# Patient Record
Sex: FEMALE | Race: WHITE | Employment: UNEMPLOYED | URBAN - METROPOLITAN AREA
[De-identification: names, ages, dates, MRNs, and addresses within clinical notes are randomized per-mention and may not be internally consistent; named-entity substitution may affect disease eponyms.]

---

## 2020-09-30 ENCOUNTER — HOSPITAL ENCOUNTER (INPATIENT)
Age: 35
LOS: 1 days | Discharge: HOME | DRG: 560 | End: 2020-10-01
Attending: OBSTETRICS & GYNECOLOGY | Admitting: OBSTETRICS & GYNECOLOGY
Payer: MEDICAID

## 2020-09-30 ENCOUNTER — APPOINTMENT (OUTPATIENT)
Dept: LABOR AND DELIVERY | Age: 35
DRG: 560 | End: 2020-09-30
Payer: MEDICAID

## 2020-09-30 ENCOUNTER — ANESTHESIA EVENT (OUTPATIENT)
Dept: LABOR AND DELIVERY | Age: 35
End: 2020-09-30

## 2020-09-30 ENCOUNTER — ANESTHESIA (OUTPATIENT)
Dept: LABOR AND DELIVERY | Age: 35
End: 2020-09-30

## 2020-09-30 PROBLEM — Z3A.39 39 WEEKS GESTATION OF PREGNANCY: Status: ACTIVE | Noted: 2020-09-30

## 2020-09-30 LAB
ABO/RH: NORMAL
AMPHETAMINE SCREEN, URINE: NOT DETECTED
ANTIBODY SCREEN: NORMAL
BARBITURATE SCREEN URINE: NOT DETECTED
BENZODIAZEPINE SCREEN, URINE: NOT DETECTED
CANNABINOID SCREEN URINE: POSITIVE
COCAINE METABOLITE SCREEN URINE: POSITIVE
FENTANYL SCREEN, URINE: NOT DETECTED
HCT VFR BLD CALC: 34.8 % (ref 34–48)
HEMOGLOBIN: 11.8 G/DL (ref 11.5–15.5)
Lab: ABNORMAL
MCH RBC QN AUTO: 31.7 PG (ref 26–35)
MCHC RBC AUTO-ENTMCNC: 33.9 % (ref 32–34.5)
MCV RBC AUTO: 93.5 FL (ref 80–99.9)
METHADONE SCREEN, URINE: NOT DETECTED
OPIATE SCREEN URINE: NOT DETECTED
OXYCODONE URINE: NOT DETECTED
PDW BLD-RTO: 13.6 FL (ref 11.5–15)
PHENCYCLIDINE SCREEN URINE: NOT DETECTED
PLATELET # BLD: 233 E9/L (ref 130–450)
PMV BLD AUTO: 10.6 FL (ref 7–12)
RBC # BLD: 3.72 E12/L (ref 3.5–5.5)
WBC # BLD: 10.2 E9/L (ref 4.5–11.5)

## 2020-09-30 PROCEDURE — 1220000000 HC SEMI PRIVATE OB R&B

## 2020-09-30 PROCEDURE — 6370000000 HC RX 637 (ALT 250 FOR IP): Performed by: OBSTETRICS & GYNECOLOGY

## 2020-09-30 PROCEDURE — 80307 DRUG TEST PRSMV CHEM ANLYZR: CPT

## 2020-09-30 PROCEDURE — 7200000001 HC VAGINAL DELIVERY

## 2020-09-30 PROCEDURE — 2580000003 HC RX 258: Performed by: OBSTETRICS & GYNECOLOGY

## 2020-09-30 PROCEDURE — 86900 BLOOD TYPING SEROLOGIC ABO: CPT

## 2020-09-30 PROCEDURE — 6360000002 HC RX W HCPCS: Performed by: OBSTETRICS & GYNECOLOGY

## 2020-09-30 PROCEDURE — 6360000002 HC RX W HCPCS

## 2020-09-30 PROCEDURE — 86901 BLOOD TYPING SEROLOGIC RH(D): CPT

## 2020-09-30 PROCEDURE — 36415 COLL VENOUS BLD VENIPUNCTURE: CPT

## 2020-09-30 PROCEDURE — G0480 DRUG TEST DEF 1-7 CLASSES: HCPCS

## 2020-09-30 PROCEDURE — 3E033VJ INTRODUCTION OF OTHER HORMONE INTO PERIPHERAL VEIN, PERCUTANEOUS APPROACH: ICD-10-PCS | Performed by: OBSTETRICS & GYNECOLOGY

## 2020-09-30 PROCEDURE — 88307 TISSUE EXAM BY PATHOLOGIST: CPT

## 2020-09-30 PROCEDURE — 86850 RBC ANTIBODY SCREEN: CPT

## 2020-09-30 PROCEDURE — 85027 COMPLETE CBC AUTOMATED: CPT

## 2020-09-30 RX ORDER — NALBUPHINE HCL 10 MG/ML
5 AMPUL (ML) INJECTION EVERY 4 HOURS PRN
Status: DISCONTINUED | OUTPATIENT
Start: 2020-09-30 | End: 2020-09-30 | Stop reason: RX

## 2020-09-30 RX ORDER — FERROUS SULFATE 325(65) MG
325 TABLET ORAL 2 TIMES DAILY WITH MEALS
Status: DISCONTINUED | OUTPATIENT
Start: 2020-09-30 | End: 2020-10-01 | Stop reason: HOSPADM

## 2020-09-30 RX ORDER — DOCUSATE SODIUM 100 MG/1
100 CAPSULE, LIQUID FILLED ORAL 2 TIMES DAILY
Status: DISCONTINUED | OUTPATIENT
Start: 2020-09-30 | End: 2020-10-01 | Stop reason: HOSPADM

## 2020-09-30 RX ORDER — LIDOCAINE HYDROCHLORIDE 10 MG/ML
INJECTION, SOLUTION INFILTRATION; PERINEURAL
Status: DISCONTINUED
Start: 2020-09-30 | End: 2020-09-30

## 2020-09-30 RX ORDER — NALOXONE HYDROCHLORIDE 0.4 MG/ML
0.4 INJECTION, SOLUTION INTRAMUSCULAR; INTRAVENOUS; SUBCUTANEOUS PRN
Status: DISCONTINUED | OUTPATIENT
Start: 2020-09-30 | End: 2020-09-30

## 2020-09-30 RX ORDER — SODIUM CHLORIDE, SODIUM LACTATE, POTASSIUM CHLORIDE, CALCIUM CHLORIDE 600; 310; 30; 20 MG/100ML; MG/100ML; MG/100ML; MG/100ML
INJECTION, SOLUTION INTRAVENOUS CONTINUOUS
Status: DISCONTINUED | OUTPATIENT
Start: 2020-09-30 | End: 2020-09-30

## 2020-09-30 RX ORDER — ACETAMINOPHEN 650 MG
TABLET, EXTENDED RELEASE ORAL
Status: DISCONTINUED
Start: 2020-09-30 | End: 2020-09-30

## 2020-09-30 RX ORDER — ONDANSETRON 4 MG/1
8 TABLET, ORALLY DISINTEGRATING ORAL EVERY 8 HOURS PRN
Status: DISCONTINUED | OUTPATIENT
Start: 2020-09-30 | End: 2020-10-01 | Stop reason: HOSPADM

## 2020-09-30 RX ORDER — ONDANSETRON 2 MG/ML
4 INJECTION INTRAMUSCULAR; INTRAVENOUS EVERY 6 HOURS PRN
Status: DISCONTINUED | OUTPATIENT
Start: 2020-09-30 | End: 2020-09-30

## 2020-09-30 RX ORDER — SIMETHICONE 80 MG
80 TABLET,CHEWABLE ORAL EVERY 6 HOURS PRN
Status: DISCONTINUED | OUTPATIENT
Start: 2020-09-30 | End: 2020-10-01 | Stop reason: HOSPADM

## 2020-09-30 RX ORDER — ONDANSETRON 2 MG/ML
4 INJECTION INTRAMUSCULAR; INTRAVENOUS EVERY 6 HOURS PRN
Status: DISCONTINUED | OUTPATIENT
Start: 2020-09-30 | End: 2020-10-01 | Stop reason: HOSPADM

## 2020-09-30 RX ORDER — IBUPROFEN 800 MG/1
800 TABLET ORAL EVERY 8 HOURS
Status: DISCONTINUED | OUTPATIENT
Start: 2020-10-01 | End: 2020-10-01 | Stop reason: HOSPADM

## 2020-09-30 RX ORDER — MODIFIED LANOLIN
OINTMENT (GRAM) TOPICAL PRN
Status: DISCONTINUED | OUTPATIENT
Start: 2020-09-30 | End: 2020-10-01 | Stop reason: HOSPADM

## 2020-09-30 RX ORDER — ACETAMINOPHEN 325 MG/1
650 TABLET ORAL EVERY 4 HOURS PRN
Status: DISCONTINUED | OUTPATIENT
Start: 2020-09-30 | End: 2020-10-01 | Stop reason: HOSPADM

## 2020-09-30 RX ORDER — BISACODYL 10 MG
10 SUPPOSITORY, RECTAL RECTAL DAILY PRN
Status: DISCONTINUED | OUTPATIENT
Start: 2020-09-30 | End: 2020-10-01 | Stop reason: HOSPADM

## 2020-09-30 RX ORDER — HYDROCODONE BITARTRATE AND ACETAMINOPHEN 5; 325 MG/1; MG/1
1 TABLET ORAL EVERY 4 HOURS PRN
Status: DISCONTINUED | OUTPATIENT
Start: 2020-09-30 | End: 2020-10-01 | Stop reason: HOSPADM

## 2020-09-30 RX ADMIN — HYDROCODONE BITARTRATE AND ACETAMINOPHEN 1 TABLET: 5; 325 TABLET ORAL at 16:31

## 2020-09-30 RX ADMIN — HYDROCODONE BITARTRATE AND ACETAMINOPHEN 1 TABLET: 5; 325 TABLET ORAL at 20:32

## 2020-09-30 RX ADMIN — Medication 1 MILLI-UNITS/MIN: at 08:15

## 2020-09-30 RX ADMIN — DOCUSATE SODIUM 100 MG: 100 CAPSULE ORAL at 20:32

## 2020-09-30 RX ADMIN — SODIUM CHLORIDE, POTASSIUM CHLORIDE, SODIUM LACTATE AND CALCIUM CHLORIDE: 600; 310; 30; 20 INJECTION, SOLUTION INTRAVENOUS at 08:00

## 2020-09-30 RX ADMIN — BUTORPHANOL TARTRATE 1 MG: 2 INJECTION, SOLUTION INTRAMUSCULAR; INTRAVENOUS at 12:54

## 2020-09-30 RX ADMIN — BUTORPHANOL TARTRATE 2 MG: 2 INJECTION, SOLUTION INTRAMUSCULAR; INTRAVENOUS at 10:46

## 2020-09-30 ASSESSMENT — PAIN SCALES - GENERAL
PAINLEVEL_OUTOF10: 9
PAINLEVEL_OUTOF10: 2
PAINLEVEL_OUTOF10: 10
PAINLEVEL_OUTOF10: 8
PAINLEVEL_OUTOF10: 7

## 2020-09-30 ASSESSMENT — LIFESTYLE VARIABLES: SMOKING_STATUS: 1

## 2020-09-30 NOTE — PROGRESS NOTES
Received phone call that patient was [facility] however was refusing to have the fetal monitors on due to discomfort. Upon arrival pitocin was off and there were no monitors on the baby. D/w patient and partner that they need to understand I have no idea how the fetal heart rate/pattern is doing. She could be having decelerations that I am not aware of and these could affect the baby possibly even in the long term. Pt understands but is continuing to refuse them.

## 2020-09-30 NOTE — ANESTHESIA PRE PROCEDURE
Department of Anesthesiology  Preprocedure Note       Name:  Ruth Celestin   Age:  37 y.o.  :  1985                                          MRN:  [id number]         Date:  2020      Surgeon: * No surgeons listed *    Procedure: * No procedures listed *    Medications prior to admission:   Prior to Admission medications    Medication Sig Start Date End Date Taking? Authorizing Provider   Prenatal MV & Min w/FA-DHA (PRENATAL ADULT GUMMY/DHA/FA PO) Take by mouth   Yes Historical Provider, MD   Prenatal Vit-Fe Fumarate-FA (PRENATAL VITAMIN) 27-0.8 MG TABS Take 1 tablet by mouth daily 3/19/20  Yes JACK Diggs - CNP       Current medications:    Current Facility-Administered Medications   Medication Dose Route Frequency Provider Last Rate Last Dose    povidone-iodine (BETADINE) 10 % external solution             lidocaine 1 % injection             lactated ringers infusion   Intravenous Continuous Selma Cellar,  mL/hr at 20 0800      ondansetron (ZOFRAN) injection 4 mg  4 mg Intravenous Q6H PRN Selma Cellar, DO        oxytocin (PITOCIN) 30 units in 500 mL infusion  1 vivian-units/min Intravenous Continuous PRN Selma Cellar, DO        oxytocin (PITOCIN) 30 units in 500 mL infusion  1 vivian-units/min Intravenous Continuous Selma Cellar, DO 1 mL/hr at 20 0815 1 vivian-units/min at 20 0815    butorphanol (STADOL) injection 2 mg  2 mg Intravenous Q3H PRN Selma Cellar, DO           Allergies:  No Known Allergies    Problem List:    Patient Active Problem List   Diagnosis Code    39 weeks gestation of pregnancy [id number].[id number]       Past Medical History:        Diagnosis Date    Mitral valve prolapse     Mitral valve prolapse        Past Surgical History:  No past surgical history on file.     Social History:    Social History     Tobacco Use    Smoking status: Current Every Day Smoker     Packs/day: 0.25     Types: Cigarettes    Smokeless tobacco: Never Used Substance Use Topics    Alcohol use: Not Currently     Comment: occasionally                                Ready to quit: Not Answered  Counseling given: Not Answered      Vital Signs (Current):   Vitals:    09/30/20 0742   BP: 134/67   Pulse: 96   Resp: 16   Temp: 36.9 °C (98.4 °F)   TempSrc: Oral   Weight: 148 lb (67.1 kg)   Height: 5' 5\" (1.651 m)                                              BP Readings from Last 3 Encounters:   09/30/20 134/67   09/29/20 115/83   09/22/20 125/85       NPO Status:                                                                                 BMI:   Wt Readings from Last 3 Encounters:   09/30/20 148 lb (67.1 kg)   09/29/20 148 lb 9.6 oz (67.4 kg)   09/22/20 146 lb 12.8 oz (66.6 kg)     Body mass index is 24.63 kg/m². CBC:   Lab Results   Component Value Date    WBC 10.2 09/30/2020    RBC 3.72 09/30/2020    HGB 11.8 09/30/2020    HCT 34.8 09/30/2020    MCV 93.5 09/30/2020    RDW 13.6 09/30/2020     09/30/2020       CMP: No results found for: NA, K, CL, CO2, BUN, CREATININE, GFRAA, AGRATIO, LABGLOM, GLUCOSE, PROT, CALCIUM, BILITOT, ALKPHOS, AST, ALT    POC Tests: No results for input(s): POCGLU, POCNA, POCK, POCCL, POCBUN, POCHEMO, POCHCT in the last 72 hours.     Coags: No results found for: PROTIME, INR, APTT    HCG (If Applicable):   Lab Results   Component Value Date    PREGTESTUR POSITIVE 03/19/2020        ABGs: No results found for: PHART, PO2ART, VLE7OIU, FHU1ERQ, BEART, Q5JLAFTF     Type & Screen (If Applicable):  No results found for: LABABO, LABRH    Drug/Infectious Status (If Applicable):  No results found for: HIV, HEPCAB    COVID-19 Screening (If Applicable): No results found for: COVID19      Anesthesia Evaluation  Patient summary reviewed and Nursing notes reviewed no history of anesthetic complications:   Airway: Mallampati: II  TM distance: >3 FB   Neck ROM: full  Mouth opening: > = 3 FB Dental:      Comment: Pt denies chipped, missing or loose teeth Pulmonary:   (+) decreased breath sounds,  current smoker ( chana >3 weeks, did smoke cigarettes 9/30/20)          Patient smoked on day of surgery. Cardiovascular:    (+) valvular problems/murmurs: MVP,         Rhythm: regular  Rate: normal           Beta Blocker:  Not on Beta Blocker      ROS comment: Pt states she had hx of MVP, no current issues. Pt has not taken medications for it since 2003 and currently does not see a cardiologist.     Neuro/Psych:   Negative Neuro/Psych ROS              GI/Hepatic/Renal:   (+) GERD: well controlled,           Endo/Other:                     Abdominal:           Vascular: negative vascular ROS. Anesthesia Plan      general, spinal and epidural     ASA 2     (Discussed in thorough detail risks and benefits associated with an epidural, spinal and general anesthesia. Pt understands and has no questions at this time.)        Anesthetic plan and risks discussed with patient. Use of blood products discussed with patient whom consented to blood products. Plan discussed with CRNA and attending.                 Eugene Donato RN   9/30/2020

## 2020-09-30 NOTE — L&D DELIVERY NOTE
Rafiq Morales  37 y.o. French Done at Gestational Age: 44 wks delivered via spontaneous vaginal under no anesthesia over no episiotomy a male infant at 1500  Weighing pending Apgars 8,9. Placenta with 3VC. No lacerations. Shoulder delivered without difficulty. EBL 100cc. Nuchal x 2. Cord gases were obtained.     Alesha Magdaleno  9/30/2020 3:10 PM

## 2020-09-30 NOTE — PROGRESS NOTES
Went to retrieve betadine and lidocaine which was sitting on the front counter, placed there by me. Medications were missing. Asked other staff members if they had taken it, and no one has seen it. Social work consulted for positive drug screen.

## 2020-09-30 NOTE — PROGRESS NOTES
Dr Daniel Byrd updated on [facility] AC/0 station. Patient feeling pressure with the occasional urge to push. Patient is refusing to be monitored on the EFM. Ordered to shut pitocin off unless she is cooperative with pitocin. Dr Daniel Byrd on her way for delivery.

## 2020-09-30 NOTE — PROGRESS NOTES
Dr Karo Tesfaye updated on [facility] 4-5/80/-2. Reviewed FHR tracing for moderate variability with accels and occasional variable. Patient very uncomfortable, but does not want an epidural. No new orders at this time.

## 2020-09-30 NOTE — H&P
CHIEF COMPLAINT:  IOL    HISTORY OF PRESENT ILLNESS:      The patient is a 37 y.o. female at 39w0d. OB History        3    Para   2    Term                AB        Living           SAB        TAB        Ectopic        Molar        Multiple        Live Births                Patient presents with a chief complaint as above and is being admitted for induction    Estimated Due Date: Estimated Date of Delivery: 10/7/20    PRENATAL CARE:    Complicated by: none    PAST OB HISTORY  OB History        3    Para   2    Term                AB        Living           SAB        TAB        Ectopic        Molar        Multiple        Live Births                    Past Medical History:        Diagnosis Date    Mitral valve prolapse     Mitral valve prolapse      Past Surgical History:    No past surgical history on file. Allergies:  Patient has no known allergies.   Social History:    Social History     Socioeconomic History    Marital status: Single     Spouse name: Not on file    Number of children: Not on file    Years of education: Not on file    Highest education level: Not on file   Occupational History    Not on file   Social Needs    Financial resource strain: Not on file    Food insecurity     Worry: Not on file     Inability: Not on file    Transportation needs     Medical: Not on file     Non-medical: Not on file   Tobacco Use    Smoking status: Current Every Day Smoker     Packs/day: 0.25     Types: Cigarettes    Smokeless tobacco: Never Used   Substance and Sexual Activity    Alcohol use: Not Currently     Comment: occasionally    Drug use: Yes     Types: Marijuana    Sexual activity: Yes     Partners: Male   Lifestyle    Physical activity     Days per week: Not on file     Minutes per session: Not on file    Stress: Not on file   Relationships    Social connections     Talks on phone: Not on file     Gets together: Not on file     Attends Caodaism service: Not on file     Active member of club or organization: Not on file     Attends meetings of clubs or organizations: Not on file     Relationship status: Not on file    Intimate partner violence     Fear of current or ex partner: Not on file     Emotionally abused: Not on file     Physically abused: Not on file     Forced sexual activity: Not on file   Other Topics Concern    Not on file   Social History Narrative    Not on file     Family History:       Problem Relation Age of Onset    Breast Cancer Paternal Grandmother     Breast Cancer Maternal Grandmother      Medications Prior to Admission:  Medications Prior to Admission: Prenatal MV & Min w/FA-DHA (PRENATAL ADULT GUMMY/DHA/FA PO), Take by mouth  Prenatal Vit-Fe Fumarate-FA (PRENATAL VITAMIN) 27-0.8 MG TABS, Take 1 tablet by mouth daily  [DISCONTINUED] ibuprofen (ADVIL;MOTRIN) 600 MG tablet, Take 1 tablet by mouth every 6 hours as needed for Pain. REVIEW OF SYSTEMS:    CONSTITUTIONAL:  negative  RESPIRATORY:  negative  CARDIOVASCULAR:  negative  GASTROINTESTINAL:  negative  ALLERGIC/IMMUNOLOGIC:  negative  NEUROLOGICAL:  negative  BEHAVIOR/PSYCH:  negative    PHYSICAL EXAM:  Vitals:    09/30/20 0742   BP: 134/67   Pulse: 96   Resp: 16   Temp: 98.4 °F (36.9 °C)   TempSrc: Oral   Weight: 148 lb (67.1 kg)   Height: 5' 5\" (1.651 m)     General appearance:  awake, alert, cooperative, no apparent distress, and appears stated age  Neurologic:  Awake, alert, oriented to name, place and time. Lungs:  No increased work of breathing, good air exchange  Abdomen:  Soft, non tender, gravid, consistent with her gestational age   Fetal heart rate:  Reassuring.   Pelvis:  Adequate pelvis  Cervix: 2 cm 75% soft -2  Contraction frequency:  0 minutes    Membranes:  Intact    ASSESSMENT AND PLAN:    Labor: admit  Fetus: Reassuring  GBS: No  Other: pitocin      Electronically signed by Juan Solorzano DO on 9/30/2020 at 9:11 AM

## 2020-10-01 VITALS
BODY MASS INDEX: 24.66 KG/M2 | DIASTOLIC BLOOD PRESSURE: 61 MMHG | RESPIRATION RATE: 16 BRPM | TEMPERATURE: 98.4 F | SYSTOLIC BLOOD PRESSURE: 111 MMHG | HEART RATE: 88 BPM | HEIGHT: 65 IN | WEIGHT: 148 LBS

## 2020-10-01 LAB
HCT VFR BLD CALC: 34.5 % (ref 34–48)
HEMOGLOBIN: 11.7 G/DL (ref 11.5–15.5)

## 2020-10-01 PROCEDURE — 6370000000 HC RX 637 (ALT 250 FOR IP): Performed by: OBSTETRICS & GYNECOLOGY

## 2020-10-01 PROCEDURE — 36415 COLL VENOUS BLD VENIPUNCTURE: CPT

## 2020-10-01 PROCEDURE — 85018 HEMOGLOBIN: CPT

## 2020-10-01 PROCEDURE — 85014 HEMATOCRIT: CPT

## 2020-10-01 RX ADMIN — IBUPROFEN 800 MG: 800 TABLET ORAL at 11:51

## 2020-10-01 RX ADMIN — DOCUSATE SODIUM 100 MG: 100 CAPSULE ORAL at 08:11

## 2020-10-01 RX ADMIN — HYDROCODONE BITARTRATE AND ACETAMINOPHEN 1 TABLET: 5; 325 TABLET ORAL at 02:41

## 2020-10-01 RX ADMIN — HYDROCODONE BITARTRATE AND ACETAMINOPHEN 1 TABLET: 5; 325 TABLET ORAL at 13:30

## 2020-10-01 RX ADMIN — HYDROCODONE BITARTRATE AND ACETAMINOPHEN 1 TABLET: 5; 325 TABLET ORAL at 08:11

## 2020-10-01 ASSESSMENT — PAIN SCALES - GENERAL
PAINLEVEL_OUTOF10: 8
PAINLEVEL_OUTOF10: 5
PAINLEVEL_OUTOF10: 8
PAINLEVEL_OUTOF10: 8

## 2020-10-01 NOTE — PLAN OF CARE
Problem: Fluid Volume - Imbalance:  Goal: Absence of imbalanced fluid volume signs and symptoms  Description: Absence of imbalanced fluid volume signs and symptoms  Outcome: Met This Shift  Goal: Absence of postpartum hemorrhage signs and symptoms  Description: Absence of postpartum hemorrhage signs and symptoms  Outcome: Met This Shift     Problem: Infection - Risk of, Puerperal Infection:  Goal: Will show no infection signs and symptoms  Description: Will show no infection signs and symptoms  Outcome: Met This Shift     Problem: Mood - Altered:  Goal: Mood stable  Description: Mood stable  Outcome: Met This Shift     Problem: Pain - Acute:  Goal: Pain level will decrease  Description: Pain level will decrease  Outcome: Met This Shift     Problem:  Body Temperature -  Risk of, Imbalanced  Goal: Ability to maintain a body temperature in the normal range will improve to within specified parameters  Description: Ability to maintain a body temperature in the normal range will improve to within specified parameters  Outcome: Met This Shift     Problem: Breastfeeding - Ineffective:  Goal: Effective breastfeeding  Description: Effective breastfeeding  Outcome: Completed  Goal: Infant weight gain appropriate for age will improve to within specified parameters  Description: Infant weight gain appropriate for age will improve to within specified parameters  Outcome: Completed  Goal: Ability to achieve and maintain adequate urine output will improve to within specified parameters  Description: Ability to achieve and maintain adequate urine output will improve to within specified parameters  Outcome: Completed     Problem: Infant Care:  Goal: Will show no infection signs and symptoms  Description: Will show no infection signs and symptoms  Outcome: Met This Shift     Problem: Pain:  Goal: Pain level will decrease  Description: Pain level will decrease  Outcome: Met This Shift  Goal: Control of acute pain  Description: Control
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
## 2020-10-01 NOTE — PROGRESS NOTES
Patient ate 75% of dinner. Discharge instructions given to patient for herself and her Baby. SHe verbalizes understanding.

## 2020-10-01 NOTE — PROGRESS NOTES
Progress Note    SUBJECTIVE: patient status post vaginal delivery day 1 doing well ,no complaints normal postpartum course    OBJECTIVE:    VITALS:  /71   Pulse 77   Temp 98 °F (36.7 °C) (Oral)   Resp 16   Ht 5' 5\" (1.651 m)   Wt 148 lb (67.1 kg)   LMP 01/01/2020 (Exact Date)   Breastfeeding Unknown   BMI 24.63 kg/m²   Physical Exam  lung:cta  Heart : regular rythm  Abdomen:soft non tender ,firm uterus ,bs present  Extremities :normal no evidence of DVT  DATA:  CBC:   Lab Results   Component Value Date    WBC 10.2 09/30/2020    RBC 3.72 09/30/2020    HGB 11.7 10/01/2020    HCT 34.5 10/01/2020    MCV 93.5 09/30/2020    MCH 31.7 09/30/2020    MCHC 33.9 09/30/2020    RDW 13.6 09/30/2020     09/30/2020    MPV 10.6 09/30/2020       ASSESSMENT AND PLAN:  Patient Active Problem List   Diagnosis    39 weeks gestation of pregnancy       Normal post partum care   Anticipate discharge home

## 2020-10-01 NOTE — CARE COORDINATION
she was willing to work and \" do anything\" to make sure she could parent baby. SW provided support and counseling resources. SW completed [facility] ( [phone]) referral to Maryanne Brunner in intake, and received a return call from [facility] ( [phone]) supervisor, Dylon Santana, who reported that a case WILL be open with family, however at this time baby CAN be discharged home and they will follow in the community       PLAN    Baby CAN be discharged home when medically ready, children services WILL follow in the community.        Electronically signed by DUNIA Patel on 10/1/2020 at 11:58 AM

## 2020-10-06 LAB — CANNABINOIDS CONF, URINE: 126 NG/ML

## 2020-10-06 NOTE — CARE COORDINATION
SW Discharge Planning     SW noted baby's cordstat to be positive for THC, Benzoylecgonine and f-TE-Mgyyyoieazdpesj.  KAVON called [facility] ( [phone]) and provided information to , Shree Carey    Electronically signed by DUNIA Walter on 10/6/2020 at 2:47 PM

## 2020-10-07 LAB — COCAINE, CONFIRM, URINE: 713 NG/ML

## 2020-12-09 ENCOUNTER — OFFICE VISIT (OUTPATIENT)
Dept: INTERNAL MEDICINE CLINIC | Age: 35
End: 2020-12-09
Payer: MEDICAID

## 2020-12-09 VITALS
DIASTOLIC BLOOD PRESSURE: 61 MMHG | TEMPERATURE: 97.9 F | SYSTOLIC BLOOD PRESSURE: 129 MMHG | HEIGHT: 65 IN | BODY MASS INDEX: 20.49 KG/M2 | HEART RATE: 107 BPM | WEIGHT: 123 LBS

## 2020-12-09 LAB
ALCOHOL URINE: ABNORMAL
AMPHETAMINE SCREEN, URINE: ABNORMAL
BARBITURATE SCREEN, URINE: ABNORMAL
BENZODIAZEPINE SCREEN, URINE: ABNORMAL
BUPRENORPHINE URINE: ABNORMAL
COCAINE METABOLITE SCREEN URINE: ABNORMAL
FENTANYL SCREEN, URINE: ABNORMAL
GABAPENTIN SCREEN, URINE: ABNORMAL
MDMA URINE: ABNORMAL
METHADONE SCREEN, URINE: ABNORMAL
METHAMPHETAMINE, URINE: ABNORMAL
OPIATE SCREEN URINE: ABNORMAL
OXYCODONE SCREEN URINE: ABNORMAL
PHENCYCLIDINE SCREEN URINE: ABNORMAL
PROPOXYPHENE SCREEN, URINE: ABNORMAL
SYNTHETIC CANNABINOIDS(K2) SCREEN, URINE: ABNORMAL
THC SCREEN, URINE: ABNORMAL
TRAMADOL SCREEN URINE: ABNORMAL
TRICYCLIC ANTIDEPRESSANTS, UR: ABNORMAL

## 2020-12-09 PROCEDURE — G8427 DOCREV CUR MEDS BY ELIG CLIN: HCPCS | Performed by: INTERNAL MEDICINE

## 2020-12-09 PROCEDURE — 99204 OFFICE O/P NEW MOD 45 MIN: CPT | Performed by: INTERNAL MEDICINE

## 2020-12-09 PROCEDURE — 80305 DRUG TEST PRSMV DIR OPT OBS: CPT | Performed by: INTERNAL MEDICINE

## 2020-12-09 PROCEDURE — G8420 CALC BMI NORM PARAMETERS: HCPCS | Performed by: INTERNAL MEDICINE

## 2020-12-09 PROCEDURE — G8484 FLU IMMUNIZE NO ADMIN: HCPCS | Performed by: INTERNAL MEDICINE

## 2020-12-09 PROCEDURE — 4004F PT TOBACCO SCREEN RCVD TLK: CPT | Performed by: INTERNAL MEDICINE

## 2020-12-09 NOTE — PROGRESS NOTES
Verbal order per Dr. Wagner Mackenzie for urine drug screen. Positive for FTY BZO MOP THC . Verified results with Gayle Lopez RN. Dr. Wagner Mackenzie ordered Subutex 8mg tab daily for patient. Verified dose with patient. Patient was sent home with Subutex 8mg tab qty 1 out of stock and will be seen back in the office 12/10/20. Ordered Viral Load.
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
## 2020-12-10 ENCOUNTER — OFFICE VISIT (OUTPATIENT)
Dept: INTERNAL MEDICINE CLINIC | Age: 35
End: 2020-12-10
Payer: MEDICAID

## 2020-12-10 VITALS
BODY MASS INDEX: 20.83 KG/M2 | TEMPERATURE: 98.2 F | DIASTOLIC BLOOD PRESSURE: 60 MMHG | WEIGHT: 125 LBS | SYSTOLIC BLOOD PRESSURE: 116 MMHG | HEIGHT: 65 IN | HEART RATE: 91 BPM

## 2020-12-10 PROCEDURE — G8427 DOCREV CUR MEDS BY ELIG CLIN: HCPCS | Performed by: INTERNAL MEDICINE

## 2020-12-10 PROCEDURE — 99213 OFFICE O/P EST LOW 20 MIN: CPT | Performed by: INTERNAL MEDICINE

## 2020-12-10 PROCEDURE — 80305 DRUG TEST PRSMV DIR OPT OBS: CPT | Performed by: INTERNAL MEDICINE

## 2020-12-10 PROCEDURE — G8484 FLU IMMUNIZE NO ADMIN: HCPCS | Performed by: INTERNAL MEDICINE

## 2020-12-10 PROCEDURE — G8420 CALC BMI NORM PARAMETERS: HCPCS | Performed by: INTERNAL MEDICINE

## 2020-12-10 PROCEDURE — 4004F PT TOBACCO SCREEN RCVD TLK: CPT | Performed by: INTERNAL MEDICINE

## 2020-12-10 RX ORDER — BUPRENORPHINE HYDROCHLORIDE 8 MG/1
8 TABLET SUBLINGUAL DAILY
COMMUNITY
End: 2020-12-10 | Stop reason: SDUPTHER

## 2020-12-10 RX ORDER — BUPRENORPHINE HYDROCHLORIDE 8 MG/1
8 TABLET SUBLINGUAL 2 TIMES DAILY
Qty: 10 TABLET | Refills: 0 | Status: SHIPPED | OUTPATIENT
Start: 2020-12-10 | End: 2020-12-15 | Stop reason: SDUPTHER
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
## 2020-12-15 ENCOUNTER — OFFICE VISIT (OUTPATIENT)
Dept: INTERNAL MEDICINE CLINIC | Age: 35
End: 2020-12-15
Payer: MEDICAID

## 2020-12-15 VITALS
WEIGHT: 123 LBS | TEMPERATURE: 98 F | SYSTOLIC BLOOD PRESSURE: 133 MMHG | DIASTOLIC BLOOD PRESSURE: 69 MMHG | HEART RATE: 119 BPM | HEIGHT: 65 IN | BODY MASS INDEX: 20.49 KG/M2

## 2020-12-15 PROCEDURE — 80305 DRUG TEST PRSMV DIR OPT OBS: CPT | Performed by: INTERNAL MEDICINE

## 2020-12-15 PROCEDURE — G8428 CUR MEDS NOT DOCUMENT: HCPCS | Performed by: INTERNAL MEDICINE

## 2020-12-15 PROCEDURE — G8484 FLU IMMUNIZE NO ADMIN: HCPCS | Performed by: INTERNAL MEDICINE

## 2020-12-15 PROCEDURE — 4004F PT TOBACCO SCREEN RCVD TLK: CPT | Performed by: INTERNAL MEDICINE

## 2020-12-15 PROCEDURE — 99213 OFFICE O/P EST LOW 20 MIN: CPT | Performed by: INTERNAL MEDICINE

## 2020-12-15 PROCEDURE — G8420 CALC BMI NORM PARAMETERS: HCPCS | Performed by: INTERNAL MEDICINE

## 2020-12-15 RX ORDER — BUPRENORPHINE HYDROCHLORIDE 8 MG/1
8 TABLET SUBLINGUAL 2 TIMES DAILY
Qty: 14 TABLET | Refills: 0 | Status: SHIPPED | OUTPATIENT
Start: 2020-12-15 | End: 2020-12-22

## 2020-12-15 NOTE — PROGRESS NOTES
Skin: No rashes, lesions or abnormalities noted        URINE DRUG SCREEN TODAY:  No results for input(s): ALCOHOL, LABAMPH, LABBARB, LABBENZ, BUPRENUR, COCAIMETSCRU, FENTSCRUR, GABAPENTIN, MDMA, METAMPU, LABMETH, OPIATESCREENURINE, OXTCOSU, PHENCYCLIDINESCREENURINE, PROPOXYPHENE, SPICEUR, THCSCREENUR, TRAMADOLUR, TRICYUR in the last 72 hours. Diagnosis Orders   1. Severe opioid use disorder (HCC)  POCT Rapid Drug Screen    buprenorphine (SUBUTEX) 8 MG SUBL SL tablet   2. Encounter for monitoring Subutex maintenance therapy     3. Polysubstance abuse ([facility] [address]     4.  Breast feeding status of mother           PLAN:  I am going to put the patient on Subutex 8 mg twice daily   we gave her a list of counselors   she is also checking out  meetings  I am going to see her back next Tuesday

## 2020-12-23 ENCOUNTER — NURSE ONLY (OUTPATIENT)
Dept: INTERNAL MEDICINE CLINIC | Age: 35
End: 2020-12-23
Payer: MEDICAID

## 2020-12-23 ENCOUNTER — TELEPHONE (OUTPATIENT)
Dept: INTERNAL MEDICINE CLINIC | Age: 35
End: 2020-12-23

## 2020-12-23 VITALS
BODY MASS INDEX: 20.66 KG/M2 | SYSTOLIC BLOOD PRESSURE: 128 MMHG | HEIGHT: 65 IN | WEIGHT: 124 LBS | HEART RATE: 116 BPM | DIASTOLIC BLOOD PRESSURE: 76 MMHG | TEMPERATURE: 98 F

## 2020-12-23 PROCEDURE — 80305 DRUG TEST PRSMV DIR OPT OBS: CPT | Performed by: INTERNAL MEDICINE

## 2020-12-23 RX ORDER — BUPRENORPHINE HYDROCHLORIDE 8 MG/1
8 TABLET SUBLINGUAL 2 TIMES DAILY
COMMUNITY
End: 2020-12-23 | Stop reason: SDUPTHER

## 2020-12-23 RX ORDER — BUPRENORPHINE HYDROCHLORIDE 8 MG/1
8 TABLET SUBLINGUAL 2 TIMES DAILY
Qty: 16 TABLET | Refills: 0 | OUTPATIENT
Start: 2020-12-23 | End: 2020-12-29 | Stop reason: SDUPTHER

## 2020-12-23 NOTE — TELEPHONE ENCOUNTER
VO per Dr. Allan Rosario for Subutex 8mg tab BID for 8 day qty 16. LPN called in script of Subutex 8mg tab BID for 8 day qty 16 into [facility] in [facility].

## 2020-12-29 ENCOUNTER — OFFICE VISIT (OUTPATIENT)
Dept: INTERNAL MEDICINE CLINIC | Age: 35
End: 2020-12-29
Payer: MEDICAID

## 2020-12-29 VITALS
HEART RATE: 109 BPM | DIASTOLIC BLOOD PRESSURE: 72 MMHG | WEIGHT: 130 LBS | BODY MASS INDEX: 21.66 KG/M2 | TEMPERATURE: 97.3 F | SYSTOLIC BLOOD PRESSURE: 139 MMHG | HEIGHT: 65 IN

## 2020-12-29 PROCEDURE — 99213 OFFICE O/P EST LOW 20 MIN: CPT | Performed by: INTERNAL MEDICINE

## 2020-12-29 PROCEDURE — G8427 DOCREV CUR MEDS BY ELIG CLIN: HCPCS | Performed by: INTERNAL MEDICINE

## 2020-12-29 PROCEDURE — 80305 DRUG TEST PRSMV DIR OPT OBS: CPT | Performed by: INTERNAL MEDICINE

## 2020-12-29 PROCEDURE — 4004F PT TOBACCO SCREEN RCVD TLK: CPT | Performed by: INTERNAL MEDICINE

## 2020-12-29 PROCEDURE — G8420 CALC BMI NORM PARAMETERS: HCPCS | Performed by: INTERNAL MEDICINE

## 2020-12-29 PROCEDURE — G8484 FLU IMMUNIZE NO ADMIN: HCPCS | Performed by: INTERNAL MEDICINE

## 2020-12-29 RX ORDER — BUPRENORPHINE HYDROCHLORIDE 8 MG/1
8 TABLET SUBLINGUAL 2 TIMES DAILY
Qty: 28 TABLET | Refills: 0 | Status: SHIPPED | OUTPATIENT
Start: 2020-12-29 | End: 2021-01-12 | Stop reason: SDUPTHER

## 2020-12-29 NOTE — PROGRESS NOTES
Skin: No rashes, lesions or abnormalities noted        URINE DRUG SCREEN TODAY:  Recent Labs     12/29/20  0920   ALCOHOL NEG   LABAMPH NEG   LABBARB NEG   LABBENZ NEG   BUPRENUR POS   COCAIMETSCRU NEG   FENTSCRUR NEG   GABAPENTIN N/A   MDMA NEG   METAMPU NEG   LABMETH NEG   OPIATESCREENURINE NEG   OXTCOSU NEG   PHENCYCLIDINESCREENURINE NEG   PROPOXYPHENE N/A   SPICEUR NEG   THCSCREENUR NEG   TRAMADOLUR NEG   TRICYUR N/A           Diagnosis Orders   1. Severe opioid use disorder (HCC)  POCT Rapid Drug Screen    buprenorphine (SUBUTEX) 8 MG SUBL SL tablet   2. Encounter for monitoring Subutex maintenance therapy     3. Breast feeding status of mother     4.  Polysubstance abuse ([facility] [address]           PLAN:  I am going to continue the patient on Subutex 8 mg twice daily   we gave her a list of counselors   she is also checking out  meetings  I am going to see her back in 2 weeks  I reviewed the [facility] report     There does not appear to be any discrepancies or overprescribing of controlled substances

## 2021-01-12 ENCOUNTER — OFFICE VISIT (OUTPATIENT)
Dept: INTERNAL MEDICINE CLINIC | Age: 36
End: 2021-01-12
Payer: MEDICAID

## 2021-01-12 VITALS
BODY MASS INDEX: 21.99 KG/M2 | WEIGHT: 132 LBS | HEIGHT: 65 IN | DIASTOLIC BLOOD PRESSURE: 79 MMHG | SYSTOLIC BLOOD PRESSURE: 119 MMHG | HEART RATE: 105 BPM

## 2021-01-12 DIAGNOSIS — F11.20 SEVERE OPIOID USE DISORDER (HCC): Primary | ICD-10-CM

## 2021-01-12 DIAGNOSIS — Z51.81 ENCOUNTER FOR MONITORING SUBUTEX MAINTENANCE THERAPY: ICD-10-CM

## 2021-01-12 DIAGNOSIS — F19.10 POLYSUBSTANCE ABUSE (HCC): ICD-10-CM

## 2021-01-12 DIAGNOSIS — Z79.899 ENCOUNTER FOR MONITORING SUBUTEX MAINTENANCE THERAPY: ICD-10-CM

## 2021-01-12 PROCEDURE — 80305 DRUG TEST PRSMV DIR OPT OBS: CPT | Performed by: INTERNAL MEDICINE

## 2021-01-12 PROCEDURE — G8484 FLU IMMUNIZE NO ADMIN: HCPCS | Performed by: INTERNAL MEDICINE

## 2021-01-12 PROCEDURE — 4004F PT TOBACCO SCREEN RCVD TLK: CPT | Performed by: INTERNAL MEDICINE

## 2021-01-12 PROCEDURE — G8420 CALC BMI NORM PARAMETERS: HCPCS | Performed by: INTERNAL MEDICINE

## 2021-01-12 PROCEDURE — G8428 CUR MEDS NOT DOCUMENT: HCPCS | Performed by: INTERNAL MEDICINE

## 2021-01-12 PROCEDURE — 99213 OFFICE O/P EST LOW 20 MIN: CPT | Performed by: INTERNAL MEDICINE

## 2021-01-12 RX ORDER — BUPRENORPHINE HYDROCHLORIDE 8 MG/1
8 TABLET SUBLINGUAL 2 TIMES DAILY
Qty: 28 TABLET | Refills: 0 | Status: SHIPPED | OUTPATIENT
Start: 2021-01-12 | End: 2021-01-26 | Stop reason: SDUPTHER
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
## 2021-01-12 NOTE — PROGRESS NOTES
Verbal order per Dr. Renae Cat for urine drug screen. Positive for BUP. Verified results with Ana Lilia Harper RN. Dr. Renae Cat ordered Subutex 8mg tab BID  for patient. Verified dose with patient. Patient was sent home with 2 week script of Subutex 8mg tab BID and will be seen back in the office 1/26/21.

## 2021-01-26 ENCOUNTER — OFFICE VISIT (OUTPATIENT)
Dept: INTERNAL MEDICINE CLINIC | Age: 36
End: 2021-01-26
Payer: MEDICAID

## 2021-01-26 VITALS
WEIGHT: 131 LBS | HEIGHT: 65 IN | BODY MASS INDEX: 21.83 KG/M2 | DIASTOLIC BLOOD PRESSURE: 77 MMHG | HEART RATE: 104 BPM | SYSTOLIC BLOOD PRESSURE: 107 MMHG

## 2021-01-26 DIAGNOSIS — B18.2 CHRONIC HEPATITIS C WITHOUT HEPATIC COMA (HCC): ICD-10-CM

## 2021-01-26 DIAGNOSIS — F11.20 SEVERE OPIOID USE DISORDER (HCC): Primary | ICD-10-CM

## 2021-01-26 DIAGNOSIS — Z51.81 ENCOUNTER FOR MONITORING SUBUTEX MAINTENANCE THERAPY: ICD-10-CM

## 2021-01-26 DIAGNOSIS — F19.10 POLYSUBSTANCE ABUSE (HCC): ICD-10-CM

## 2021-01-26 DIAGNOSIS — Z79.899 ENCOUNTER FOR MONITORING SUBUTEX MAINTENANCE THERAPY: ICD-10-CM

## 2021-01-26 PROCEDURE — G8420 CALC BMI NORM PARAMETERS: HCPCS | Performed by: INTERNAL MEDICINE

## 2021-01-26 PROCEDURE — G8484 FLU IMMUNIZE NO ADMIN: HCPCS | Performed by: INTERNAL MEDICINE

## 2021-01-26 PROCEDURE — 99213 OFFICE O/P EST LOW 20 MIN: CPT | Performed by: INTERNAL MEDICINE

## 2021-01-26 PROCEDURE — G8427 DOCREV CUR MEDS BY ELIG CLIN: HCPCS | Performed by: INTERNAL MEDICINE

## 2021-01-26 PROCEDURE — 80305 DRUG TEST PRSMV DIR OPT OBS: CPT | Performed by: INTERNAL MEDICINE

## 2021-01-26 PROCEDURE — 4004F PT TOBACCO SCREEN RCVD TLK: CPT | Performed by: INTERNAL MEDICINE

## 2021-01-26 RX ORDER — BUPRENORPHINE HYDROCHLORIDE 8 MG/1
8 TABLET SUBLINGUAL 2 TIMES DAILY
Qty: 28 TABLET | Refills: 0 | Status: SHIPPED | OUTPATIENT
Start: 2021-01-26 | End: 2021-02-09 | Stop reason: SDUPTHER

## 2021-01-26 NOTE — PROGRESS NOTES
Verbal order per Dr. Donal Galo for urine drug screen. Positive for BUP. Verified results with Julien Mahajan RN. Dr. Donal Galo ordered Subutex 8mg tab BID  for patient. Verified dose with patient. Patient was sent home with 2 week script of Subutex 8mg tab BID and will be seen back in the office 2/9/21.
Thought Content: Thought content normal.         Judgment: Judgment normal.         Urine tox screen today  Alcohol, Urine 01/26/2021  9:42 AM Unknown   NEG    Amphetamine Screen, Urine 01/26/2021  9:42 AM Unknown   NEG    Barbiturate Screen, Urine 01/26/2021  9:42 AM Unknown   NEG    Benzodiazepine Screen, Urine 01/26/2021  9:42 AM Unknown   NEG    Buprenorphine Urine 01/26/2021  9:42 AM Unknown   POS    Cocaine Metabolite Screen, Urine 01/26/2021  9:42 AM Unknown   NEG    FENTANYL SCREEN, URINE 01/26/2021  9:42 AM Unknown   NEG    Gabapentin Screen, Urine 01/26/2021  9:42 AM Unknown   N/A    MDMA, Urine 01/26/2021  9:42 AM Unknown   NEG    Methadone Screen, Urine 01/26/2021  9:42 AM Unknown   NEG    Methamphetamine, Urine 01/26/2021  9:42 AM Unknown   NEG    Opiate Scrn, Ur 01/26/2021  9:42 AM Unknown   NEG    Oxycodone Screen, Ur 01/26/2021  9:42 AM Unknown   NEG    PCP Screen, Urine 01/26/2021  9:42 AM Unknown   NEG    Propoxyphene Screen, Urine 01/26/2021  9:42 AM Unknown   N/A    Synthetic Cannabinoids (K2) Screen, Urine 01/26/2021  9:42 AM Unknown   NEG    THC Screen, Urine 01/26/2021  9:42 AM Unknown   NEG    Tramadol Scrn, Ur 01/26/2021  9:42 AM Unknown   NEG    Tricyclic Antidepressants, Urine 01/26/2021  9:42 AM Unknown   N/A        I reviewed the [place] [facility] report     There does not appear to be any discrepancies or overprescribing of controlled substances          An electronic signature was used to authenticate this note.     --Wilmer Villalobos MD

## 2021-02-04 ENCOUNTER — TELEPHONE (OUTPATIENT)
Dept: INTERNAL MEDICINE CLINIC | Age: 36
End: 2021-02-04

## 2021-02-04 NOTE — LETTER
[id number] [facility] Internal Medicine and Medication Management  [place] [facility]  Phone: [phone]  Fax: [phone]    Annie Bautista MD        2021        To Whom it may concern,    Kelly Sahu ( [birth year]) initiated services at our clinic on 2020 for treatment of opioid use disorder. At this time she has started on suboxone therapy for treatment of her opioid use disorder. She continues to be compliant with treatment recommendations. She is currently being seen every 2 weeks for appointments. Her most recent appointment was 2021. Should you have further questions please contact our office at the number listed above.       Sincerely,        Annie Bautista MD

## 2021-02-09 ENCOUNTER — OFFICE VISIT (OUTPATIENT)
Dept: INTERNAL MEDICINE CLINIC | Age: 36
End: 2021-02-09
Payer: MEDICAID

## 2021-02-09 VITALS
DIASTOLIC BLOOD PRESSURE: 76 MMHG | BODY MASS INDEX: 22.49 KG/M2 | TEMPERATURE: 98.2 F | HEIGHT: 65 IN | HEART RATE: 103 BPM | WEIGHT: 135 LBS | SYSTOLIC BLOOD PRESSURE: 121 MMHG

## 2021-02-09 DIAGNOSIS — F19.10 POLYSUBSTANCE ABUSE (HCC): ICD-10-CM

## 2021-02-09 DIAGNOSIS — B18.2 CHRONIC HEPATITIS C WITHOUT HEPATIC COMA (HCC): ICD-10-CM

## 2021-02-09 DIAGNOSIS — F11.20 SEVERE OPIOID USE DISORDER (HCC): Primary | ICD-10-CM

## 2021-02-09 DIAGNOSIS — Z51.81 ENCOUNTER FOR MONITORING SUBUTEX MAINTENANCE THERAPY: ICD-10-CM

## 2021-02-09 DIAGNOSIS — Z79.899 ENCOUNTER FOR MONITORING SUBUTEX MAINTENANCE THERAPY: ICD-10-CM

## 2021-02-09 PROCEDURE — 80305 DRUG TEST PRSMV DIR OPT OBS: CPT | Performed by: INTERNAL MEDICINE

## 2021-02-09 PROCEDURE — 4004F PT TOBACCO SCREEN RCVD TLK: CPT | Performed by: INTERNAL MEDICINE

## 2021-02-09 PROCEDURE — G8428 CUR MEDS NOT DOCUMENT: HCPCS | Performed by: INTERNAL MEDICINE

## 2021-02-09 PROCEDURE — 99213 OFFICE O/P EST LOW 20 MIN: CPT | Performed by: INTERNAL MEDICINE

## 2021-02-09 PROCEDURE — G8484 FLU IMMUNIZE NO ADMIN: HCPCS | Performed by: INTERNAL MEDICINE

## 2021-02-09 PROCEDURE — G8420 CALC BMI NORM PARAMETERS: HCPCS | Performed by: INTERNAL MEDICINE

## 2021-02-09 RX ORDER — BUPRENORPHINE HYDROCHLORIDE 8 MG/1
8 TABLET SUBLINGUAL 2 TIMES DAILY
Qty: 28 TABLET | Refills: 0 | Status: SHIPPED | OUTPATIENT
Start: 2021-02-09 | End: 2021-02-23 | Stop reason: SDUPTHER

## 2021-02-09 NOTE — PROGRESS NOTES
Verbal order per Dr. Feliberto Boas for urine drug screen. Positive for BUP. Verified results with Sylvia Ward RN. Dr. Feliberto Boas ordered Subutex 8mg tab BID for patient. Verified dose with patient. Patient was sent home with 2 week script of Subutex 8mg tab BID and will be seen back in the office 2/23/21.
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
## 2021-02-23 ENCOUNTER — OFFICE VISIT (OUTPATIENT)
Dept: INTERNAL MEDICINE CLINIC | Age: 36
End: 2021-02-23
Payer: MEDICAID

## 2021-02-23 VITALS
HEIGHT: 65 IN | TEMPERATURE: 98.1 F | HEART RATE: 88 BPM | DIASTOLIC BLOOD PRESSURE: 69 MMHG | WEIGHT: 142 LBS | SYSTOLIC BLOOD PRESSURE: 121 MMHG | BODY MASS INDEX: 23.66 KG/M2

## 2021-02-23 DIAGNOSIS — B18.2 CHRONIC HEPATITIS C WITHOUT HEPATIC COMA (HCC): ICD-10-CM

## 2021-02-23 DIAGNOSIS — F19.10 POLYSUBSTANCE ABUSE (HCC): ICD-10-CM

## 2021-02-23 DIAGNOSIS — Z79.899 ENCOUNTER FOR MONITORING SUBUTEX MAINTENANCE THERAPY: ICD-10-CM

## 2021-02-23 DIAGNOSIS — F11.20 SEVERE OPIOID USE DISORDER (HCC): Primary | ICD-10-CM

## 2021-02-23 DIAGNOSIS — Z51.81 ENCOUNTER FOR MONITORING SUBUTEX MAINTENANCE THERAPY: ICD-10-CM

## 2021-02-23 PROCEDURE — G8420 CALC BMI NORM PARAMETERS: HCPCS | Performed by: INTERNAL MEDICINE

## 2021-02-23 PROCEDURE — G8428 CUR MEDS NOT DOCUMENT: HCPCS | Performed by: INTERNAL MEDICINE

## 2021-02-23 PROCEDURE — G8484 FLU IMMUNIZE NO ADMIN: HCPCS | Performed by: INTERNAL MEDICINE

## 2021-02-23 PROCEDURE — 80305 DRUG TEST PRSMV DIR OPT OBS: CPT | Performed by: INTERNAL MEDICINE

## 2021-02-23 PROCEDURE — 99213 OFFICE O/P EST LOW 20 MIN: CPT | Performed by: INTERNAL MEDICINE

## 2021-02-23 PROCEDURE — 4004F PT TOBACCO SCREEN RCVD TLK: CPT | Performed by: INTERNAL MEDICINE

## 2021-02-23 RX ORDER — BUPRENORPHINE HYDROCHLORIDE 8 MG/1
8 TABLET SUBLINGUAL 2 TIMES DAILY
Qty: 6 TABLET | Refills: 0 | Status: SHIPPED | OUTPATIENT
Start: 2021-02-23 | End: 2021-02-25 | Stop reason: SDUPTHER

## 2021-02-23 NOTE — PROGRESS NOTES
Verbal order per Dr. Rayshawn Cadet for urine drug screen. Positive for FTY THC. Verified results with Clara Yañez RN. Dr. Rayshawn Cadet ordered Subutex 8mg tab BID for patient. Verified dose with patient. Patient was sent home with 3 day script of Subutex 8mg tab BID and will be seen back in the office 2/25/21.

## 2021-02-25 ENCOUNTER — OFFICE VISIT (OUTPATIENT)
Dept: INTERNAL MEDICINE CLINIC | Age: 36
End: 2021-02-25
Payer: MEDICAID

## 2021-02-25 DIAGNOSIS — Z51.81 ENCOUNTER FOR MONITORING SUBUTEX MAINTENANCE THERAPY: ICD-10-CM

## 2021-02-25 DIAGNOSIS — F19.10 POLYSUBSTANCE ABUSE (HCC): ICD-10-CM

## 2021-02-25 DIAGNOSIS — Z79.899 ENCOUNTER FOR MONITORING SUBUTEX MAINTENANCE THERAPY: ICD-10-CM

## 2021-02-25 DIAGNOSIS — B18.2 CHRONIC HEPATITIS C WITHOUT HEPATIC COMA (HCC): ICD-10-CM

## 2021-02-25 DIAGNOSIS — F11.20 SEVERE OPIOID USE DISORDER (HCC): Primary | ICD-10-CM

## 2021-02-25 PROCEDURE — 4004F PT TOBACCO SCREEN RCVD TLK: CPT | Performed by: INTERNAL MEDICINE

## 2021-02-25 PROCEDURE — 80305 DRUG TEST PRSMV DIR OPT OBS: CPT | Performed by: INTERNAL MEDICINE

## 2021-02-25 PROCEDURE — G8427 DOCREV CUR MEDS BY ELIG CLIN: HCPCS | Performed by: INTERNAL MEDICINE

## 2021-02-25 PROCEDURE — G8484 FLU IMMUNIZE NO ADMIN: HCPCS | Performed by: INTERNAL MEDICINE

## 2021-02-25 PROCEDURE — G8420 CALC BMI NORM PARAMETERS: HCPCS | Performed by: INTERNAL MEDICINE

## 2021-02-25 PROCEDURE — 99213 OFFICE O/P EST LOW 20 MIN: CPT | Performed by: INTERNAL MEDICINE

## 2021-02-25 RX ORDER — BUPRENORPHINE HYDROCHLORIDE 8 MG/1
8 TABLET SUBLINGUAL 2 TIMES DAILY
Qty: 8 TABLET | Refills: 0 | Status: SHIPPED | OUTPATIENT
Start: 2021-02-25 | End: 2021-03-01

## 2021-02-25 NOTE — PROGRESS NOTES
Verbal order per Dr. Chris Jay for urine drug screen. Positive for BUP METH MDMA FTY THC. Verified results with Binu Key RN. Dr. Chris Jay ordered Subutex 8mg tab BID for patient. Verified dose with patient. Patient was sent home with 4 day script of Subutex 8mg tab BID and will be seen back in the office 3/1/21.
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
## 2021-03-04 ENCOUNTER — TELEPHONE (OUTPATIENT)
Dept: INTERNAL MEDICINE CLINIC | Age: 36
End: 2021-03-04

## 2021-03-04 NOTE — TELEPHONE ENCOUNTER
Pt called and left a voicemail requesting a call back urgently. RN called pt back twice-- no answer, VM full. Pt is to come in today for an appointment and will not be able to do a virtual visit with Dr. Susan Duffy.

## 2021-03-04 NOTE — TELEPHONE ENCOUNTER
RN attempted to call pt twice-- pt's phone went straight to  and  full. Unable to leave message. Pt is to be scheduled with Dr. King Ballard Monday-- no virtual visit-- pt is to come in.

## 2022-02-05 ENCOUNTER — HOSPITAL ENCOUNTER (INPATIENT)
Age: 37
LOS: 2 days | Discharge: HOME | DRG: 560 | End: 2022-02-07
Attending: OBSTETRICS & GYNECOLOGY | Admitting: OBSTETRICS & GYNECOLOGY
Payer: MEDICAID

## 2022-02-05 PROBLEM — Z3A.36 36 WEEKS GESTATION OF PREGNANCY: Status: ACTIVE | Noted: 2022-02-05

## 2022-02-05 LAB
ABO/RH: NORMAL
ALBUMIN SERPL-MCNC: 2.9 G/DL (ref 3.5–5.2)
ALP BLD-CCNC: 160 U/L (ref 35–104)
ALT SERPL-CCNC: 18 U/L (ref 0–32)
AMPHETAMINE SCREEN, URINE: NOT DETECTED
ANION GAP SERPL CALCULATED.3IONS-SCNC: 12 MMOL/L (ref 7–16)
ANTIBODY SCREEN: NORMAL
AST SERPL-CCNC: 20 U/L (ref 0–31)
BACTERIA: ABNORMAL /HPF
BARBITURATE SCREEN URINE: NOT DETECTED
BENZODIAZEPINE SCREEN, URINE: NOT DETECTED
BILIRUB SERPL-MCNC: 0.5 MG/DL (ref 0–1.2)
BILIRUBIN URINE: NEGATIVE
BLOOD, URINE: ABNORMAL
BUN BLDV-MCNC: 9 MG/DL (ref 6–20)
CALCIUM SERPL-MCNC: 8.8 MG/DL (ref 8.6–10.2)
CANNABINOID SCREEN URINE: POSITIVE
CHLORIDE BLD-SCNC: 100 MMOL/L (ref 98–107)
CLARITY: ABNORMAL
CO2: 22 MMOL/L (ref 22–29)
COCAINE METABOLITE SCREEN URINE: POSITIVE
COLOR: ABNORMAL
CREAT SERPL-MCNC: 0.7 MG/DL (ref 0.5–1)
EPITHELIAL CELLS, UA: ABNORMAL /HPF
FENTANYL SCREEN, URINE: POSITIVE
GFR AFRICAN AMERICAN: >60
GFR NON-AFRICAN AMERICAN: >60 ML/MIN/1.73
GLUCOSE BLD-MCNC: 140 MG/DL (ref 74–99)
GLUCOSE URINE: NEGATIVE MG/DL
HBA1C MFR BLD: 5 % (ref 4–5.6)
HCT VFR BLD CALC: 31.9 % (ref 34–48)
HEMOGLOBIN: 10.8 G/DL (ref 11.5–15.5)
KETONES, URINE: 40 MG/DL
LEUKOCYTE ESTERASE, URINE: ABNORMAL
Lab: ABNORMAL
MCH RBC QN AUTO: 30.3 PG (ref 26–35)
MCHC RBC AUTO-ENTMCNC: 33.9 % (ref 32–34.5)
MCV RBC AUTO: 89.6 FL (ref 80–99.9)
METHADONE SCREEN, URINE: NOT DETECTED
NITRITE, URINE: POSITIVE
OPIATE SCREEN URINE: POSITIVE
OXYCODONE URINE: NOT DETECTED
PDW BLD-RTO: 14.6 FL (ref 11.5–15)
PH UA: 7 (ref 5–9)
PHENCYCLIDINE SCREEN URINE: NOT DETECTED
PLATELET # BLD: 206 E9/L (ref 130–450)
PMV BLD AUTO: 11 FL (ref 7–12)
POTASSIUM SERPL-SCNC: 3.4 MMOL/L (ref 3.5–5)
PROTEIN UA: 100 MG/DL
RBC # BLD: 3.56 E12/L (ref 3.5–5.5)
RBC UA: >20 /HPF (ref 0–2)
SODIUM BLD-SCNC: 134 MMOL/L (ref 132–146)
SPECIFIC GRAVITY UA: 1.02 (ref 1–1.03)
TOTAL PROTEIN: 6 G/DL (ref 6.4–8.3)
TSH SERPL DL<=0.05 MIU/L-ACNC: 0.55 UIU/ML (ref 0.27–4.2)
UROBILINOGEN, URINE: 4 E.U./DL
WBC # BLD: 16.1 E9/L (ref 4.5–11.5)
WBC UA: ABNORMAL /HPF (ref 0–5)

## 2022-02-05 PROCEDURE — G0480 DRUG TEST DEF 1-7 CLASSES: HCPCS

## 2022-02-05 PROCEDURE — 84443 ASSAY THYROID STIM HORMONE: CPT

## 2022-02-05 PROCEDURE — 86777 TOXOPLASMA ANTIBODY: CPT

## 2022-02-05 PROCEDURE — 6370000000 HC RX 637 (ALT 250 FOR IP): Performed by: OBSTETRICS & GYNECOLOGY

## 2022-02-05 PROCEDURE — 86703 HIV-1/HIV-2 1 RESULT ANTBDY: CPT

## 2022-02-05 PROCEDURE — 86787 VARICELLA-ZOSTER ANTIBODY: CPT

## 2022-02-05 PROCEDURE — 88307 TISSUE EXAM BY PATHOLOGIST: CPT

## 2022-02-05 PROCEDURE — 83036 HEMOGLOBIN GLYCOSYLATED A1C: CPT

## 2022-02-05 PROCEDURE — 81001 URINALYSIS AUTO W/SCOPE: CPT

## 2022-02-05 PROCEDURE — 59409 OBSTETRICAL CARE: CPT | Performed by: OBSTETRICS & GYNECOLOGY

## 2022-02-05 PROCEDURE — 86901 BLOOD TYPING SEROLOGIC RH(D): CPT

## 2022-02-05 PROCEDURE — 1220000000 HC SEMI PRIVATE OB R&B

## 2022-02-05 PROCEDURE — 86592 SYPHILIS TEST NON-TREP QUAL: CPT

## 2022-02-05 PROCEDURE — 86850 RBC ANTIBODY SCREEN: CPT

## 2022-02-05 PROCEDURE — 2580000003 HC RX 258: Performed by: OBSTETRICS & GYNECOLOGY

## 2022-02-05 PROCEDURE — 85027 COMPLETE CBC AUTOMATED: CPT

## 2022-02-05 PROCEDURE — 36415 COLL VENOUS BLD VENIPUNCTURE: CPT

## 2022-02-05 PROCEDURE — 99221 1ST HOSP IP/OBS SF/LOW 40: CPT | Performed by: NURSE PRACTITIONER

## 2022-02-05 PROCEDURE — 6360000002 HC RX W HCPCS

## 2022-02-05 PROCEDURE — 7200000001 HC VAGINAL DELIVERY

## 2022-02-05 PROCEDURE — 80307 DRUG TEST PRSMV CHEM ANLYZR: CPT

## 2022-02-05 PROCEDURE — 87340 HEPATITIS B SURFACE AG IA: CPT

## 2022-02-05 PROCEDURE — 86778 TOXOPLASMA ANTIBODY IGM: CPT

## 2022-02-05 PROCEDURE — 86803 HEPATITIS C AB TEST: CPT

## 2022-02-05 PROCEDURE — 86762 RUBELLA ANTIBODY: CPT

## 2022-02-05 PROCEDURE — 80053 COMPREHEN METABOLIC PANEL: CPT

## 2022-02-05 PROCEDURE — 86900 BLOOD TYPING SEROLOGIC ABO: CPT

## 2022-02-05 RX ORDER — SODIUM CHLORIDE 9 MG/ML
25 INJECTION, SOLUTION INTRAVENOUS PRN
Status: DISCONTINUED | OUTPATIENT
Start: 2022-02-05 | End: 2022-02-07 | Stop reason: HOSPADM

## 2022-02-05 RX ORDER — MODIFIED LANOLIN
OINTMENT (GRAM) TOPICAL PRN
Status: DISCONTINUED | OUTPATIENT
Start: 2022-02-05 | End: 2022-02-07 | Stop reason: HOSPADM

## 2022-02-05 RX ORDER — OXYTOCIN 10 [USP'U]/ML
INJECTION, SOLUTION INTRAMUSCULAR; INTRAVENOUS
Status: DISCONTINUED
Start: 2022-02-05 | End: 2022-02-05

## 2022-02-05 RX ORDER — LIDOCAINE HYDROCHLORIDE 10 MG/ML
INJECTION, SOLUTION INFILTRATION; PERINEURAL
Status: DISCONTINUED
Start: 2022-02-05 | End: 2022-02-05

## 2022-02-05 RX ORDER — SODIUM CHLORIDE, SODIUM LACTATE, POTASSIUM CHLORIDE, CALCIUM CHLORIDE 600; 310; 30; 20 MG/100ML; MG/100ML; MG/100ML; MG/100ML
INJECTION, SOLUTION INTRAVENOUS CONTINUOUS
Status: DISCONTINUED | OUTPATIENT
Start: 2022-02-05 | End: 2022-02-05

## 2022-02-05 RX ORDER — OXYTOCIN 10 [USP'U]/ML
10 INJECTION, SOLUTION INTRAMUSCULAR; INTRAVENOUS ONCE
Status: COMPLETED | OUTPATIENT
Start: 2022-02-05 | End: 2022-02-05

## 2022-02-05 RX ORDER — OXYCODONE HYDROCHLORIDE 5 MG/1
10 TABLET ORAL EVERY 4 HOURS PRN
Status: DISCONTINUED | OUTPATIENT
Start: 2022-02-05 | End: 2022-02-07 | Stop reason: HOSPADM

## 2022-02-05 RX ORDER — LACTULOSE 10 G/15ML
10 SOLUTION ORAL 2 TIMES DAILY
Status: DISCONTINUED | OUTPATIENT
Start: 2022-02-05 | End: 2022-02-07 | Stop reason: HOSPADM

## 2022-02-05 RX ORDER — IBUPROFEN 800 MG/1
800 TABLET ORAL EVERY 8 HOURS PRN
Status: DISCONTINUED | OUTPATIENT
Start: 2022-02-05 | End: 2022-02-07 | Stop reason: HOSPADM

## 2022-02-05 RX ORDER — ACETAMINOPHEN 325 MG/1
650 TABLET ORAL EVERY 4 HOURS PRN
Status: DISCONTINUED | OUTPATIENT
Start: 2022-02-05 | End: 2022-02-05

## 2022-02-05 RX ORDER — SODIUM CHLORIDE 0.9 % (FLUSH) 0.9 %
10 SYRINGE (ML) INJECTION EVERY 12 HOURS SCHEDULED
Status: DISCONTINUED | OUTPATIENT
Start: 2022-02-05 | End: 2022-02-07 | Stop reason: HOSPADM

## 2022-02-05 RX ORDER — ONDANSETRON 4 MG/1
8 TABLET, ORALLY DISINTEGRATING ORAL EVERY 8 HOURS PRN
Status: DISCONTINUED | OUTPATIENT
Start: 2022-02-05 | End: 2022-02-07 | Stop reason: HOSPADM

## 2022-02-05 RX ORDER — SODIUM CHLORIDE 0.9 % (FLUSH) 0.9 %
10 SYRINGE (ML) INJECTION PRN
Status: DISCONTINUED | OUTPATIENT
Start: 2022-02-05 | End: 2022-02-07 | Stop reason: HOSPADM

## 2022-02-05 RX ORDER — ACETAMINOPHEN 650 MG
TABLET, EXTENDED RELEASE ORAL
Status: DISCONTINUED
Start: 2022-02-05 | End: 2022-02-05

## 2022-02-05 RX ORDER — OXYCODONE HYDROCHLORIDE 5 MG/1
5 TABLET ORAL EVERY 4 HOURS PRN
Status: DISCONTINUED | OUTPATIENT
Start: 2022-02-05 | End: 2022-02-07 | Stop reason: HOSPADM

## 2022-02-05 RX ADMIN — OXYCODONE HYDROCHLORIDE 10 MG: 5 TABLET ORAL at 19:01

## 2022-02-05 RX ADMIN — OXYTOCIN 10 UNITS: 10 INJECTION, SOLUTION INTRAMUSCULAR; INTRAVENOUS at 04:09

## 2022-02-05 RX ADMIN — ONDANSETRON 8 MG: 4 TABLET, ORALLY DISINTEGRATING ORAL at 16:46

## 2022-02-05 RX ADMIN — OXYTOCIN 10 UNITS: 10 INJECTION INTRAVENOUS at 04:09

## 2022-02-05 RX ADMIN — IBUPROFEN 800 MG: 800 TABLET, FILM COATED ORAL at 21:09

## 2022-02-05 RX ADMIN — OXYCODONE HYDROCHLORIDE 10 MG: 5 TABLET ORAL at 23:06

## 2022-02-05 RX ADMIN — OXYCODONE HYDROCHLORIDE 5 MG: 5 TABLET ORAL at 06:28

## 2022-02-05 RX ADMIN — LACTULOSE 10 G: 20 SOLUTION ORAL at 10:44

## 2022-02-05 RX ADMIN — IBUPROFEN 800 MG: 800 TABLET, FILM COATED ORAL at 05:18

## 2022-02-05 RX ADMIN — OXYCODONE HYDROCHLORIDE 5 MG: 5 TABLET ORAL at 10:43

## 2022-02-05 RX ADMIN — SODIUM CHLORIDE, PRESERVATIVE FREE 10 ML: 5 INJECTION INTRAVENOUS at 10:47

## 2022-02-05 RX ADMIN — OXYCODONE HYDROCHLORIDE 10 MG: 5 TABLET ORAL at 15:01

## 2022-02-05 ASSESSMENT — PAIN SCALES - GENERAL
PAINLEVEL_OUTOF10: 9
PAINLEVEL_OUTOF10: 10
PAINLEVEL_OUTOF10: 8
PAINLEVEL_OUTOF10: 9
PAINLEVEL_OUTOF10: 8
PAINLEVEL_OUTOF10: 6

## 2022-02-05 NOTE — L&D DELIVERY NOTE
VAGINAL DELIVERY NOTE    PRE OP DX:  No prenatal care, active labor, active polysubstance abuse    POST OP DX:  Same plus delivery of live female infant    PROCEDURE:      ANESTHESIA:  None    PLACENTA:  3VC spontaneous intact    MEDS:  None    COMPLICATIONS:  None    NUCHAL CORD:  None    SHOULDER DYSTOCIA:  None    EBL:  < 300 cc    LACERATIONS:  None    SPECIMENS:  Placenta    BABY:  Live female infant, vertex position direct OP presentation, 6# Anise Few with clear fluid and Apgars 8/9    Mom and baby to usual care    Peter Concepcion MD  2022

## 2022-02-05 NOTE — PROGRESS NOTES
Live  born via  in vertex position at this time. NICU present for delivery. Placenta delivered intact at 0409. No tears or repairs. Lulú care provided after delivery. Bleeding is light with small amount of rubra.  Fundus u/-1

## 2022-02-05 NOTE — PROGRESS NOTES
G4 P 1 presents to unit from ER with complaints of contractions and leaking of fluid. Denies VB. Placed on EFM. Pt reports she has had no prenatal care.  House officer made aware of arrival.

## 2022-02-05 NOTE — PROGRESS NOTES
Pt taken to postpartum unit via wheelchair in stable condition.  Pt transferred to bed with standby assist. Call light placed within reach

## 2022-02-05 NOTE — PROGRESS NOTES
Dr. Lurdes Conley and Edna Alba. RENNY Nicole arrive at bedside at this time.  Attempted to collect pt history, pt remains tense and thrashing

## 2022-02-05 NOTE — PROGRESS NOTES
Pt bonding well with , tolerating regular diet. Ambulated up to restroom with steady gait and standby assist. Voids moderate amount of clear-blood tinged urine. Pericare provided. Pt returned to bed.  Call light within reach

## 2022-02-05 NOTE — PROGRESS NOTES
Pt transferred from antepartum unit to labor and delivery via bed. Arrives at [address] [facility]. Pt transferred to delivery bed. Pt is tense and thrashing around stating \"I have to push. \" EFM placed on. Adjustments attempted while pt moving in bed.

## 2022-02-05 NOTE — FLOWSHEET NOTE
Was up to bathroom and states passed clots on pads  and felt funny. Passed one quarter size clot. Returned to bed and pt. States feels like might be  Starting to [employer]. Denies feeling dizzy or lightheaded.  Lochia rna fundus firm at .

## 2022-02-05 NOTE — FLOWSHEET NOTE
Admitted to 318. Instructed on infant safety ,safe sleep protocols,and to call nurse before getting out of bed. Readily responds to verbal stimuli but states is tired. Quarter size bruised areas on legs which pt. states always has ,no other bruising noted.

## 2022-02-05 NOTE — H&P
Department of Obstetrics and Gynecology  Nurse Practitioner Obstetrics History and Physical        CHIEF COMPLAINT:  Contractions    HISTORY OF PRESENT ILLNESS:    The patient is a 31 y.o. female , No LMP recorded (lmp unknown). ,  at Unknown. Pt presents to l&d with complaints of srom and cramping starting an hour ago. 4 prior vaginal deliveries. approx 30 weeks, based on fundal height No prenatal care. Pt states she bought what she thought was a percocet at gas station also about an hour ago. Pt has a long hx of drug abuse, was taking subutex, unsure of her last use. Hx of polysubstance abuse, (heroin, cocaine, meth, mj, opiate use), hep c. Last used heroin yesterday. OB History as of 4/3/2022        4    Para   4    Term   1       1    AB        Living   4       SAB        IAB        Ectopic        Molar        Multiple   0    Live Births   3                Estimated Due Date: Estimated Date of Delivery: None noted.     PRENATAL CARE:  No prenatal care, hep c, polysubustance abuse    Complicated by:   Patient Active Problem List   Diagnosis Code    39 weeks gestation of pregnancy Z3A.39    36 weeks gestation of pregnancy Z3A.36    No prenatal care in current pregnancy in third trimester O09.33    Normal delivery U23    Uncomplicated opioid dependence ([address] F11.20       PAST OB HISTORY  OB History as of 4/3/2022        4    Para   4    Term   1       1    AB        Living   4       SAB        IAB        Ectopic        Molar        Multiple   0    Live Births   3                Past Medical History:        Diagnosis Date    Asthma     childhood sports induced    Bipolar affective disorder, current episode depressed ([address]     Mitral valve prolapse     Mitral valve prolapse     Mitral valve prolapse     PTSD (post-traumatic stress disorder)      Past Surgical History:        Procedure Laterality Date    ELBOW SURGERY Left     childhood     Social History:    TOBACCO: reports that she has been smoking cigarettes. She has been smoking about 0.25 packs per day. She has never used smokeless tobacco.  ETOH:   reports previous alcohol use. DRUGS:   reports current drug use. Drugs: Marijuana (Weed) and IV. Family History:       Problem Relation Age of Onset    Breast Cancer Paternal Grandmother     Breast Cancer Maternal Grandmother      Medications Prior to Admission:  No medications prior to admission. Allergies:  Naloxone and Tylenol [acetaminophen]    Review of Systems:   Ears, nose, mouth, throat, and face: negative  Respiratory: negative  Cardiovascular: negative  Gastrointestinal: negative  Genitourinary:negative  Integument/breast: negative  Hematologic/lymphatic: negative  Musculoskeletal:negative  Neurological: negative  Behavioral/Psych: negative  Endocrine: negative  Allergic/Immunologic: negative  Psychosocial: negative    PHYSICAL EXAM:    General appearance:  awake, alert, cooperative, no apparent distress, and appears stated age  Neurologic:  Awake, alert, oriented to name, place and time. Cranial nerves II-XII are grossly intact.     Lungs:  clear to auscultation bilaterally  Heart:  regular rate and rhythm  Abdomen:   soft, non-distended, non-tender, no masses palpated, gravid  Fundal height 30cm  Fetal heart rate:  Baseline Heart Rate 150, accelerations:  present, decels:none  Pelvis:  External Genitalia: no lesions  Cervix:  DILATION: complete    Contraction frequency:  q2 minutes  Membranes:  srom at 0300    /63   Pulse 56   Temp 98.6 °F (37 °C) (Oral)   Resp 18   Ht 5' 5\" (1.651 m)   Wt 135 lb (61.2 kg)   LMP  (LMP Unknown)   SpO2 97%   Breastfeeding Unknown   BMI 22.47 kg/m²                 Prenatal Labs  Blood Type/Rh: A pos  Antibody Screen: negative          ASSESSMENT AND PLAN:  D/w Dr. Miguelito Gonzales  Prenatal labs  Plan for vaginal delivery        Electronically signed by JACK Menjivar CNP on 4/27/2022 at 1:13 PM

## 2022-02-06 LAB
BASOPHILS ABSOLUTE: 0.04 E9/L (ref 0–0.2)
BASOPHILS RELATIVE PERCENT: 0.4 % (ref 0–2)
BURR CELLS: ABNORMAL
EOSINOPHILS ABSOLUTE: 0.18 E9/L (ref 0.05–0.5)
EOSINOPHILS RELATIVE PERCENT: 1.7 % (ref 0–6)
HCT VFR BLD CALC: 32.9 % (ref 34–48)
HEMOGLOBIN: 10.6 G/DL (ref 11.5–15.5)
IMMATURE GRANULOCYTES #: 0.07 E9/L
IMMATURE GRANULOCYTES %: 0.7 % (ref 0–5)
LYMPHOCYTES ABSOLUTE: 2.22 E9/L (ref 1.5–4)
LYMPHOCYTES RELATIVE PERCENT: 21.5 % (ref 20–42)
MCH RBC QN AUTO: 30.3 PG (ref 26–35)
MCHC RBC AUTO-ENTMCNC: 32.2 % (ref 32–34.5)
MCV RBC AUTO: 94 FL (ref 80–99.9)
MONOCYTES ABSOLUTE: 0.58 E9/L (ref 0.1–0.95)
MONOCYTES RELATIVE PERCENT: 5.6 % (ref 2–12)
NEUTROPHILS ABSOLUTE: 7.23 E9/L (ref 1.8–7.3)
NEUTROPHILS RELATIVE PERCENT: 70.1 % (ref 43–80)
OVALOCYTES: ABNORMAL
PDW BLD-RTO: 15.1 FL (ref 11.5–15)
PLATELET # BLD: 269 E9/L (ref 130–450)
PMV BLD AUTO: 10.9 FL (ref 7–12)
POIKILOCYTES: ABNORMAL
POLYCHROMASIA: ABNORMAL
RBC # BLD: 3.5 E12/L (ref 3.5–5.5)
WBC # BLD: 10.3 E9/L (ref 4.5–11.5)

## 2022-02-06 PROCEDURE — 85025 COMPLETE CBC W/AUTO DIFF WBC: CPT

## 2022-02-06 PROCEDURE — 1220000000 HC SEMI PRIVATE OB R&B

## 2022-02-06 PROCEDURE — 36415 COLL VENOUS BLD VENIPUNCTURE: CPT

## 2022-02-06 PROCEDURE — 6370000000 HC RX 637 (ALT 250 FOR IP): Performed by: OBSTETRICS & GYNECOLOGY

## 2022-02-06 RX ADMIN — OXYCODONE HYDROCHLORIDE 10 MG: 5 TABLET ORAL at 17:25

## 2022-02-06 RX ADMIN — OXYCODONE HYDROCHLORIDE 10 MG: 5 TABLET ORAL at 13:26

## 2022-02-06 RX ADMIN — IBUPROFEN 800 MG: 800 TABLET, FILM COATED ORAL at 05:57

## 2022-02-06 RX ADMIN — OXYCODONE HYDROCHLORIDE 10 MG: 5 TABLET ORAL at 21:24

## 2022-02-06 RX ADMIN — OXYCODONE HYDROCHLORIDE 10 MG: 5 TABLET ORAL at 08:34

## 2022-02-06 RX ADMIN — OXYCODONE HYDROCHLORIDE 10 MG: 5 TABLET ORAL at 04:23

## 2022-02-06 ASSESSMENT — PAIN SCALES - GENERAL
PAINLEVEL_OUTOF10: 9
PAINLEVEL_OUTOF10: 8
PAINLEVEL_OUTOF10: 10
PAINLEVEL_OUTOF10: 10
PAINLEVEL_OUTOF10: 9
PAINLEVEL_OUTOF10: 9

## 2022-02-06 NOTE — PROGRESS NOTES
SUBJECTIVE:   Patient without complaint    OBJECTIVE:   BP (!) 105/56   Pulse 75   Temp 98.4 °F (36.9 °C) (Oral)   Resp 16   Ht 5' 5\" (1.651 m)   Wt 135 lb (61.2 kg)   LMP  (LMP Unknown)   SpO2 98%   Breastfeeding Unknown   BMI 22.47 kg/m²      Lab Results   Component Value Date    WBC 16.1 (H) 02/05/2022    HGB 10.8 (L) 02/05/2022    HCT 31.9 (L) 02/05/2022    MCV 89.6 02/05/2022     02/05/2022         Lochia normal rubra   Uterus firm, nontender    ASSESSMENT/PLAN:   Postpartum Day #1   Advance care   Home pending social service consult    Sam Folod MD 2/6/2022 8:23 AM

## 2022-02-06 NOTE — PROGRESS NOTES
rn was approached by aide regarding drawing the patients blood work for am, aide told rn she was going to draw the patients blood but when aide expressed that to the patient, the patient stated she dose not want to be stuck again and again that if we could draw from her peripheral line bc per iv team said that we could draw blood from the line, so rn went into patients room to advice patient that we do not draw blood out of peripheral lines but only when we intially put line in and that if she didn't want us to stick her then that would be refusing a important lab work regarding her blood count post delivery, pt again stated she's not refusing the test but that she dose not want stuck a ton of times and would rather it be from the line, pt then demanded the charge rn or a manager to speak to because she did not like the nurses tone, charge rn then came into room pt and nurse both explained the situation, rn then left room to make a phone call to lab regarding drawing from a line while charge remained in room to talk down patient, rn returned to apologized to patient and notify her that lab said it was ok to draw from the line. Pt accepted apology and rn then tried to draw blood back from peripheral line and no blood returned but flushed. rn explained to pt there was no blood return and that she will call phlebotomy to see if they can draw from site in am. Patient in agreement with plan.

## 2022-02-07 ENCOUNTER — HOSPITAL ENCOUNTER (OUTPATIENT)
Dept: PSYCHIATRY | Age: 37
Setting detail: THERAPIES SERIES
Discharge: HOME | End: 2022-02-07
Payer: MEDICAID

## 2022-02-07 VITALS
HEIGHT: 65 IN | HEART RATE: 56 BPM | WEIGHT: 135 LBS | OXYGEN SATURATION: 97 % | TEMPERATURE: 98.6 F | RESPIRATION RATE: 18 BRPM | BODY MASS INDEX: 22.49 KG/M2 | SYSTOLIC BLOOD PRESSURE: 106 MMHG | DIASTOLIC BLOOD PRESSURE: 63 MMHG

## 2022-02-07 DIAGNOSIS — F11.20 UNCOMPLICATED OPIOID DEPENDENCE (HCC): Primary | ICD-10-CM

## 2022-02-07 LAB
6-MAM, QUANTITATIVE, URINE: 30.3
BENZOYLECGONINE, QUANTITATIVE, URINE: >1000
CODEINE, QUANTITATIVE, URINE: 230.9
COMMENT: NORMAL
FENTANYL, URN, QUANT: 222.3
HEPATITIS B SURFACE ANTIGEN INTERPRETATION: NORMAL
HEPATITIS C ANTIBODY INTERPRETATION: REACTIVE
HIV-1 AND HIV-2 ANTIBODIES: NORMAL
HYDROCODONE, QUANTITATIVE, URINE: <50
HYDROMORPHONE, QUANTITATIVE, URINE: 137.1
INTEGRITY CHECK, CREATININE, URINE: 258.5
INTEGRITY CHECK, OXIDANT, URINE: <40
INTEGRITY CHECK, PH, URINE: 6.4 (ref 4.5–9)
INTEGRITY CHECK, SPECIFIC GRAVITY, URINE: 1.02 (ref 1–1.03)
INTEGRITY CHECK, SPECIMEN INTEGRITY, URINE: ABNORMAL
MORPHINE, QUANTITATIVE, URINE: >1000
NORFENTANYL, URN, QUANT: >1000
NORHYDROCODONE, QUANTITATIVE, URINE: <50
NOROXYCODONE, QUANTITATIVE, URINE: <50
OXYCODONE URINE, QUANTITATIVE: <50
OXYMORPHONE, QUANTITATIVE, URINE: <50
RPR: NORMAL
RUBELLA ANTIBODY IGG: NORMAL
THC NORMALIZED, QUANTITIATIVE, URINE: NORMAL
THC-COOH, QUANTITATIVE, URINE: >1000
VARICELLA-ZOSTER VIRUS AB, IGG: NORMAL

## 2022-02-07 PROCEDURE — 99238 HOSP IP/OBS DSCHRG MGMT 30/<: CPT | Performed by: MIDWIFE

## 2022-02-07 PROCEDURE — 6370000000 HC RX 637 (ALT 250 FOR IP): Performed by: OBSTETRICS & GYNECOLOGY

## 2022-02-07 PROCEDURE — 90792 PSYCH DIAG EVAL W/MED SRVCS: CPT | Performed by: PSYCHIATRY & NEUROLOGY

## 2022-02-07 RX ORDER — BUPRENORPHINE HYDROCHLORIDE 8 MG/1
8 TABLET SUBLINGUAL 2 TIMES DAILY
Qty: 14 TABLET | Refills: 0 | Status: SHIPPED | OUTPATIENT
Start: 2022-02-07 | End: 2022-02-14

## 2022-02-07 RX ORDER — IBUPROFEN 800 MG/1
800 TABLET ORAL EVERY 8 HOURS PRN
Qty: 60 TABLET | Refills: 0 | Status: SHIPPED | OUTPATIENT
Start: 2022-02-07

## 2022-02-07 RX ORDER — ONDANSETRON 4 MG/1
4 TABLET, ORALLY DISINTEGRATING ORAL EVERY 8 HOURS PRN
Qty: 30 TABLET | Refills: 0 | Status: SHIPPED | OUTPATIENT
Start: 2022-02-07

## 2022-02-07 RX ORDER — MULTIVITAMIN/MULTIMINERAL SUPPLEMENT 3080; 920; 120; 400; 22; 1.84; 3; 20; 10; 1; 12; 200; 29; 25; 2 [IU]/1; [IU]/1; MG/1; [IU]/1; [IU]/1; MG/1; MG/1; MG/1; MG/1; MG/1; UG/1; MG/1; MG/1; MG/1; MG/1
1 TABLET, FILM COATED ORAL DAILY
Qty: 30 TABLET | Refills: 11 | Status: SHIPPED | OUTPATIENT
Start: 2022-02-07

## 2022-02-07 RX ADMIN — OXYCODONE HYDROCHLORIDE 10 MG: 5 TABLET ORAL at 06:11

## 2022-02-07 RX ADMIN — OXYCODONE HYDROCHLORIDE 10 MG: 5 TABLET ORAL at 01:45

## 2022-02-07 ASSESSMENT — PAIN SCALES - GENERAL
PAINLEVEL_OUTOF10: 9
PAINLEVEL_OUTOF10: 7

## 2022-02-07 NOTE — CARE COORDINATION
SW Discharge Planning   SW received consult for \" no prenatal care, polysubstance abuse\"     KAVON met privately with Jayjay Engel ( [phone]) mother to baby girl Julia Jenkins ( 2/5/22) and introduced self and role. Gisella Colón reported that she currently resides at the address listed in the chart and stated that the father of this baby is Phoenix Sanchez ( 8/5/92). Gisella Colón stated that due to substance abuse issues she does not currently have custody of her children,  Isidor Batters ( 4//4/06)Jen Grandi ( 1/2/12) and Almlaial Gift ( 9/3020) . Gisella Colón stated that the children are currently staying with Ila Cunningham and his mother. Oseas Hinojosa also has a history of substance abuse however has been doing well in treatment and has been staying sober. Gisella Colón stated that she is currently unemployed and baby will be added to her [employer]. Gisella Colón stated that she had no reason for not seeking prenatal care, however reported that pediatric care will be with [address] [place] [address]. KAVON discussed the importance of baby receiving adequate pediatric care and she expressed understanding. Gisella Colón Reported that she has all needed items including a car seat and pack and play. We discussed safe sleep practices. Gisella Colón was agreeable to a [facility] and [facility] referral.      Per Gisella Colón, she has a previous history of addiction to [facility] and several years ago went to senior care for drug possession. Gisella Colón reported that [facility] children services was involved and that she lost custody of her daughters for awhile before getting custody of them back. Gisella Colón stated that she lost custody of her children again after the birth of her son due to ongoing substance abuse. Gisella Colón also reported that she suffers from Bipolar and PTSD. Gisella Colón did admit to percocet usage a few days ago due to having increased pain, however denied using any other substances. Gisella Colón was informed of her positive UDS for cocaine ,THC fentanyl and opiates on  2/5/22.  Tiny expressed understanding for the need of a [facility] ( [phone]) referral.       Valentine Hwang completed [facility] ( [phone]) referral to an  who declined to provide her name.      PLAN     Baby can NOT be discharged home until [facility] ( [phone]) referral. provides disposition  SW to continue communication with nursing staff and [facility] ( [phone])    Electronically signed by DUNIA Porras on 2/7/2022 at 10:01 AM

## 2022-02-07 NOTE — FLOWSHEET NOTE
Left to have meeting with Yung Lovelace, Care Coordination. States will be back after meeting Baby in [facility].

## 2022-02-07 NOTE — PROGRESS NOTES
PPD#2    S:  Feels good, states she is ready to go home  Still having some cramping, bleeding \"good\"  Still needs to be seen by social work    O:  VSS,a febrile  Abdomen soft, fundus firm 2 below  Bleeding mod rubra, no clots  No edema    A:  Normal PPD#2    P:  Discharge to home, office 2 weeks

## 2022-02-07 NOTE — DISCHARGE SUMMARY
Patient admitted in active labor on 2022. History of active poly substance abuse and no prenatal care. Progressed to complete,  liveborn female on 2022 at 04:05am.  Postpartum course unremarkable. Discharge to home in stable condition, office visit 2 weeks. Baby to .
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
## 2022-02-07 NOTE — CARE COORDINATION
Peer Recovery Support Note    Name: Jo-Ann Fernandes  Date: 2/7/2022    Chief Complaint   Patient presents with   Denise Mia Laboring       Peer Support met with patient. [x] Support and education provided  [x] Resources provided   [x] Treatment referral: [facility]  [] Other:   [] Patient declined peer recovery services     Referred By: Josué CAMACHO Notes: Will continue to follow.     Manohar Cutler, 2/7/2022

## 2022-02-07 NOTE — PROGRESS NOTES
CLINICAL PHARMACY NOTE: MEDS TO BEDS    Total # of Prescriptions Filled: 2   The following medications were delivered to the patient:  ·  mg  · [place] plus     Additional Documentation:

## 2022-02-07 NOTE — PLAN OF CARE
Problem: Pain:  Goal: Pain level will decrease  Description: Pain level will decrease  Outcome: Met This Shift  Goal: Control of acute pain  Description: Control of acute pain  Outcome: Met This Shift  Goal: Control of chronic pain  Description: Control of chronic pain  Outcome: Met This Shift     Problem: Constipation:  Goal: Bowel elimination is within specified parameters  Description: Bowel elimination is within specified parameters  Outcome: Met This Shift     Problem: Fluid Volume - Imbalance:  Goal: Absence of imbalanced fluid volume signs and symptoms  Description: Absence of imbalanced fluid volume signs and symptoms  Outcome: Met This Shift  Goal: Absence of postpartum hemorrhage signs and symptoms  Description: Absence of postpartum hemorrhage signs and symptoms  Outcome: Met This Shift     Problem: Infection - Risk of, Puerperal Infection:  Goal: Will show no infection signs and symptoms  Description: Will show no infection signs and symptoms  Outcome: Met This Shift     Problem: Mood - Altered:  Goal: Mood stable  Description: Mood stable  Outcome: Met This Shift     Problem: Pain - Acute:  Goal: Pain level will decrease  Description: Pain level will decrease  Outcome: Met This Shift

## 2022-02-08 NOTE — CARE COORDINATION
SW Discharge Planning     SW received a call from [address] ( [phone]) . Per [facility] children services is planning on taking custody of baby at this time, and if Finn Lei can provide a suitable family member for placement, they will place baby with a family member. If no suitable family member can be found, baby will be placed in foster care. KAVON did meet with Tiny to provide support. At this time, Finn Lei appears to have been compliant with treatment. Per both Chart review and discussion with Finn Lei, she met with [facility] Dr. Marysol Sheth for suboxone treatment and has an appointment in 7 days for follow up if she is unable to get in to see a doctor closer to her. Finn Lei has remained by baby's bedside and has been attentive and affectionate with baby. Finn Lei did inform SW that she will need to  her suboxone medication around 2 PM today, and reported that a family member will be taking her so that she won't be left unattended. SW did take Finn Lei outside for fresh air as she was upset that baby may be taken into children services custody. Finn Lei easily engaged in conversation and expressed desire to continue treatment and become healthier. KAVON offered support.      PLAN    Baby can NOT be discharged home until [facility] ( [phone]) referral. provides disposition  SW to continue communication with nursing staff and [facility] ( [phone])    Electronically signed by DUNIA Hines on 2/8/2022 at 11:51 AM

## 2022-02-09 LAB
TOXOPLASMA IGM ANTIBODY: NORMAL
TOXOPLASMOSIS IGG AB: NORMAL

## 2022-02-10 NOTE — CARE COORDINATION
SW Discharge Planning       SW received a call from [facility] ,  Julito Jenkins, who reported that baby now can NOT be discharged to maternal aunt and uncle. Per Julito Jenkins, \" the first background check came back fine but we found something on the second one\". Julito Jenkins stated that she is still attempting to figure out a plan to place baby with family, however she reported that mother has not been responding to her calls.      PLAN    Baby can NOT be discharged home until [facility] ( [phone]) referral. provides disposition  SW to continue communication with nursing staff and [facility] ( [phone])    Electronically signed by DUNIA Gnosalez on 2/10/2022 at 9:11 AM

## 2022-12-20 ENCOUNTER — CLINICAL DOCUMENTATION (OUTPATIENT)
Dept: INTERNAL MEDICINE CLINIC | Age: 37
End: 2022-12-20

## 2022-12-20 NOTE — PROGRESS NOTES
Patient contacted office to complete updated screening to schedule for an appointment . Patient last saw Dr. Mauri Jimenez in 3/21. Patient reports that she has moved back to [facility], is pregnant again and is wanting to be clean. Chasity completed screening. Chasity scheduled patient with Sarah Sims CNP upon request. Chasity will update Immanuelima Lax prior to patient appointment. Patient is scheduled for 12/22 @ .

## 2023-01-16 ENCOUNTER — ANCILLARY PROCEDURE (OUTPATIENT)
Dept: OBGYN CLINIC | Age: 38
DRG: 566 | End: 2023-01-16
Payer: MEDICAID

## 2023-01-16 ENCOUNTER — HOSPITAL ENCOUNTER (INPATIENT)
Age: 38
LOS: 1 days | Discharge: HOME | DRG: 566 | End: 2023-01-17
Attending: OBSTETRICS & GYNECOLOGY | Admitting: STUDENT IN AN ORGANIZED HEALTH CARE EDUCATION/TRAINING PROGRAM
Payer: MEDICAID

## 2023-01-16 DIAGNOSIS — F11.93 WITHDRAWAL FROM OPIOIDS (HCC): Primary | ICD-10-CM

## 2023-01-16 PROBLEM — R82.5 POSITIVE URINE DRUG SCREEN: Status: ACTIVE | Noted: 2023-01-16

## 2023-01-16 PROBLEM — Z32.01 POSITIVE PREGNANCY TEST: Status: ACTIVE | Noted: 2023-01-16

## 2023-01-16 LAB
ABO/RH: NORMAL
ALBUMIN SERPL-MCNC: 2.8 G/DL (ref 3.5–5.2)
ALP BLD-CCNC: 81 U/L (ref 35–104)
ALT SERPL-CCNC: 17 U/L (ref 0–32)
AMNISURE, POC: NEGATIVE
AMPHETAMINE SCREEN, URINE: NOT DETECTED
ANION GAP SERPL CALCULATED.3IONS-SCNC: 11 MMOL/L (ref 7–16)
ANTIBODY SCREEN: NORMAL
AST SERPL-CCNC: 17 U/L (ref 0–31)
BARBITURATE SCREEN URINE: NOT DETECTED
BASOPHILS ABSOLUTE: 0.02 E9/L (ref 0–0.2)
BASOPHILS RELATIVE PERCENT: 0.2 % (ref 0–2)
BENZODIAZEPINE SCREEN, URINE: NOT DETECTED
BILIRUB SERPL-MCNC: 0.4 MG/DL (ref 0–1.2)
BUN BLDV-MCNC: 6 MG/DL (ref 6–20)
CALCIUM SERPL-MCNC: 8.5 MG/DL (ref 8.6–10.2)
CANNABINOID SCREEN URINE: NOT DETECTED
CHLORIDE BLD-SCNC: 103 MMOL/L (ref 98–107)
CO2: 20 MMOL/L (ref 22–29)
COCAINE METABOLITE SCREEN URINE: POSITIVE
CREAT SERPL-MCNC: 0.5 MG/DL (ref 0.5–1)
EOSINOPHILS ABSOLUTE: 0.02 E9/L (ref 0.05–0.5)
EOSINOPHILS RELATIVE PERCENT: 0.2 % (ref 0–6)
FENTANYL SCREEN, URINE: POSITIVE
GFR SERPL CREATININE-BSD FRML MDRD: >60 ML/MIN/1.73
GLUCOSE BLD-MCNC: 87 MG/DL (ref 74–99)
HCT VFR BLD CALC: 34.8 % (ref 34–48)
HEMOGLOBIN: 11.9 G/DL (ref 11.5–15.5)
IMMATURE GRANULOCYTES #: 0.06 E9/L
IMMATURE GRANULOCYTES %: 0.6 % (ref 0–5)
LYMPHOCYTES ABSOLUTE: 0.96 E9/L (ref 1.5–4)
LYMPHOCYTES RELATIVE PERCENT: 10.2 % (ref 20–42)
Lab: ABNORMAL
Lab: NORMAL
MAGNESIUM: 2 MG/DL (ref 1.6–2.6)
MCH RBC QN AUTO: 30.7 PG (ref 26–35)
MCHC RBC AUTO-ENTMCNC: 34.2 % (ref 32–34.5)
MCV RBC AUTO: 89.9 FL (ref 80–99.9)
METHADONE SCREEN, URINE: NOT DETECTED
MONOCYTES ABSOLUTE: 0.24 E9/L (ref 0.1–0.95)
MONOCYTES RELATIVE PERCENT: 2.6 % (ref 2–12)
NEGATIVE QC PASS/FAIL: NORMAL
NEUTROPHILS ABSOLUTE: 8.1 E9/L (ref 1.8–7.3)
NEUTROPHILS RELATIVE PERCENT: 86.2 % (ref 43–80)
OPIATE SCREEN URINE: NOT DETECTED
OXYCODONE URINE: NOT DETECTED
PDW BLD-RTO: 13.4 FL (ref 11.5–15)
PHENCYCLIDINE SCREEN URINE: NOT DETECTED
PLATELET # BLD: 205 E9/L (ref 130–450)
PMV BLD AUTO: 10.3 FL (ref 7–12)
POSITIVE QC PASS/FAIL: NORMAL
POTASSIUM SERPL-SCNC: 3.5 MMOL/L (ref 3.5–5)
RBC # BLD: 3.87 E12/L (ref 3.5–5.5)
SODIUM BLD-SCNC: 134 MMOL/L (ref 132–146)
TOTAL PROTEIN: 5.9 G/DL (ref 6.4–8.3)
TROPONIN, HIGH SENSITIVITY: <6 NG/L (ref 0–9)
WBC # BLD: 9.4 E9/L (ref 4.5–11.5)

## 2023-01-16 PROCEDURE — G0480 DRUG TEST DEF 1-7 CLASSES: HCPCS

## 2023-01-16 PROCEDURE — 36415 COLL VENOUS BLD VENIPUNCTURE: CPT

## 2023-01-16 PROCEDURE — 76819 FETAL BIOPHYS PROFIL W/O NST: CPT | Performed by: OBSTETRICS & GYNECOLOGY

## 2023-01-16 PROCEDURE — 76805 OB US >/= 14 WKS SNGL FETUS: CPT | Performed by: OBSTETRICS & GYNECOLOGY

## 2023-01-16 PROCEDURE — 76820 UMBILICAL ARTERY ECHO: CPT | Performed by: OBSTETRICS & GYNECOLOGY

## 2023-01-16 PROCEDURE — 80053 COMPREHEN METABOLIC PANEL: CPT

## 2023-01-16 PROCEDURE — 86592 SYPHILIS TEST NON-TREP QUAL: CPT

## 2023-01-16 PROCEDURE — 87491 CHLMYD TRACH DNA AMP PROBE: CPT

## 2023-01-16 PROCEDURE — 86803 HEPATITIS C AB TEST: CPT

## 2023-01-16 PROCEDURE — 84484 ASSAY OF TROPONIN QUANT: CPT

## 2023-01-16 PROCEDURE — 99221 1ST HOSP IP/OBS SF/LOW 40: CPT | Performed by: OBSTETRICS & GYNECOLOGY

## 2023-01-16 PROCEDURE — 93005 ELECTROCARDIOGRAM TRACING: CPT | Performed by: INTERNAL MEDICINE

## 2023-01-16 PROCEDURE — 86762 RUBELLA ANTIBODY: CPT

## 2023-01-16 PROCEDURE — 83735 ASSAY OF MAGNESIUM: CPT

## 2023-01-16 PROCEDURE — 6360000002 HC RX W HCPCS: Performed by: STUDENT IN AN ORGANIZED HEALTH CARE EDUCATION/TRAINING PROGRAM

## 2023-01-16 PROCEDURE — 86850 RBC ANTIBODY SCREEN: CPT

## 2023-01-16 PROCEDURE — 1220000000 HC SEMI PRIVATE OB R&B

## 2023-01-16 PROCEDURE — 99254 IP/OBS CNSLTJ NEW/EST MOD 60: CPT | Performed by: OBSTETRICS & GYNECOLOGY

## 2023-01-16 PROCEDURE — 6370000000 HC RX 637 (ALT 250 FOR IP): Performed by: INTERNAL MEDICINE

## 2023-01-16 PROCEDURE — 87340 HEPATITIS B SURFACE AG IA: CPT

## 2023-01-16 PROCEDURE — 6360000002 HC RX W HCPCS

## 2023-01-16 PROCEDURE — 85025 COMPLETE CBC W/AUTO DIFF WBC: CPT

## 2023-01-16 PROCEDURE — 2580000003 HC RX 258: Performed by: INTERNAL MEDICINE

## 2023-01-16 PROCEDURE — 80307 DRUG TEST PRSMV CHEM ANLYZR: CPT

## 2023-01-16 PROCEDURE — 6360000002 HC RX W HCPCS: Performed by: PSYCHIATRY & NEUROLOGY

## 2023-01-16 PROCEDURE — 87389 HIV-1 AG W/HIV-1&-2 AB AG IA: CPT

## 2023-01-16 PROCEDURE — 86901 BLOOD TYPING SEROLOGIC RH(D): CPT

## 2023-01-16 PROCEDURE — 86900 BLOOD TYPING SEROLOGIC ABO: CPT

## 2023-01-16 PROCEDURE — 87591 N.GONORRHOEAE DNA AMP PROB: CPT

## 2023-01-16 PROCEDURE — 99255 IP/OBS CONSLTJ NEW/EST HI 80: CPT | Performed by: INTERNAL MEDICINE

## 2023-01-16 RX ORDER — PROMETHAZINE HYDROCHLORIDE 25 MG/ML
25 INJECTION, SOLUTION INTRAMUSCULAR; INTRAVENOUS EVERY 6 HOURS PRN
Status: DISCONTINUED | OUTPATIENT
Start: 2023-01-16 | End: 2023-01-17 | Stop reason: HOSPADM

## 2023-01-16 RX ORDER — ONDANSETRON 2 MG/ML
INJECTION INTRAMUSCULAR; INTRAVENOUS
Status: COMPLETED
Start: 2023-01-16 | End: 2023-01-16

## 2023-01-16 RX ORDER — ONDANSETRON 2 MG/ML
4 INJECTION INTRAMUSCULAR; INTRAVENOUS EVERY 6 HOURS PRN
Status: DISCONTINUED | OUTPATIENT
Start: 2023-01-16 | End: 2023-01-17 | Stop reason: HOSPADM

## 2023-01-16 RX ORDER — SODIUM CHLORIDE 9 MG/ML
INJECTION, SOLUTION INTRAVENOUS CONTINUOUS
Status: DISCONTINUED | OUTPATIENT
Start: 2023-01-16 | End: 2023-01-17 | Stop reason: HOSPADM

## 2023-01-16 RX ORDER — PRENATAL WITH FERROUS FUM AND FOLIC ACID 3080; 920; 120; 400; 22; 1.84; 3; 20; 10; 1; 12; 200; 27; 25; 2 [IU]/1; [IU]/1; MG/1; [IU]/1; MG/1; MG/1; MG/1; MG/1; MG/1; MG/1; UG/1; MG/1; MG/1; MG/1; MG/1
1 TABLET ORAL DAILY
Status: DISCONTINUED | OUTPATIENT
Start: 2023-01-16 | End: 2023-01-17 | Stop reason: HOSPADM

## 2023-01-16 RX ORDER — POTASSIUM CHLORIDE 20 MEQ/1
40 TABLET, EXTENDED RELEASE ORAL ONCE
Status: COMPLETED | OUTPATIENT
Start: 2023-01-16 | End: 2023-01-16

## 2023-01-16 RX ORDER — LORAZEPAM 2 MG/ML
1 INJECTION INTRAMUSCULAR ONCE
Status: COMPLETED | OUTPATIENT
Start: 2023-01-16 | End: 2023-01-16

## 2023-01-16 RX ORDER — BUPRENORPHINE HYDROCHLORIDE 8 MG/1
8 TABLET SUBLINGUAL ONCE
Status: COMPLETED | OUTPATIENT
Start: 2023-01-16 | End: 2023-01-16

## 2023-01-16 RX ADMIN — BUPRENORPHINE HCL 8 MG: 8 TABLET SUBLINGUAL at 22:43

## 2023-01-16 RX ADMIN — ONDANSETRON 4 MG: 2 INJECTION INTRAMUSCULAR; INTRAVENOUS at 15:03

## 2023-01-16 RX ADMIN — SODIUM CHLORIDE: 9 INJECTION, SOLUTION INTRAVENOUS at 18:24

## 2023-01-16 RX ADMIN — ONDANSETRON 4 MG: 2 INJECTION INTRAMUSCULAR; INTRAVENOUS at 20:45

## 2023-01-16 RX ADMIN — LORAZEPAM 1 MG: 2 INJECTION INTRAMUSCULAR at 22:56

## 2023-01-16 RX ADMIN — PROMETHAZINE HYDROCHLORIDE 25 MG: 25 INJECTION INTRAMUSCULAR; INTRAVENOUS at 22:47

## 2023-01-16 RX ADMIN — Medication 1 TABLET: at 18:25

## 2023-01-16 RX ADMIN — POTASSIUM CHLORIDE 40 MEQ: 20 TABLET, EXTENDED RELEASE ORAL at 18:25

## 2023-01-16 ASSESSMENT — PAIN SCALES - GENERAL: PAINLEVEL_OUTOF10: 10

## 2023-01-16 ASSESSMENT — PAIN DESCRIPTION - DESCRIPTORS: DESCRIPTORS: ACHING

## 2023-01-16 ASSESSMENT — PAIN DESCRIPTION - LOCATION: LOCATION: ABDOMEN

## 2023-01-16 NOTE — PROGRESS NOTES
Multiple attempts from 2 RN's to draw labs, unable to obtain venous access. IV team consult ordered.

## 2023-01-16 NOTE — H&P
Department of Obstetrics and Gynecology  Labor and Delivery  Triage Note      SUBJECTIVE:  Catalino Pantoja is a 30 y.o. female, [id number], No LMP recorded (lmp unknown). Patient is pregnant., Estimated Date of Delivery: None noted. here with the complaint of withdrawal. And feeling like needing to push. Prenatal course: no prenatal care. Unable to answer questions. Shakes, agitation.   Here with prison officers     PAST OB HISTORY  OB History          5    Para   4    Term   1       1    AB        Living   4         SAB        IAB        Ectopic        Molar        Multiple   0    Live Births   3                Past Medical History:        Diagnosis Date    Asthma     childhood sports induced    Bipolar affective disorder, current episode depressed ([facility] [address]     Drug abuse ([facility] [address]     Mitral valve prolapse     PTSD (post-traumatic stress disorder)      Past Surgical History:        Procedure Laterality Date    ELBOW SURGERY Left     childhood     Allergies:  Naloxone and Tylenol [acetaminophen]  Social History:      Pos tobacco, pos 1v drug use  Family History:       Problem Relation Age of Onset    Breast Cancer Paternal Grandmother     Breast Cancer Maternal Grandmother      Medications Prior to Admission:  Medications Prior to Admission: ibuprofen (ADVIL;MOTRIN) 800 MG tablet, Take 1 tablet by mouth every 8 hours as needed for Pain (Patient not taking: Reported on 2023)  Prenatal Vit-Fe Fumarate-FA (PRENATAL PLUS/IRON) 27-1 MG TABS tablet, Take 1 tablet by mouth daily (Patient not taking: Reported on 2023)  ondansetron (ZOFRAN ODT) 4 MG disintegrating tablet, Take 1 tablet by mouth every 8 hours as needed for Nausea or Vomiting (Patient not taking: Reported on 2023)    Review of Systems:   CONSTITUTIONAL:  negative  RESPIRATORY:  negative  CARDIOVASCULAR:  negative  GASTROINTESTINAL:  negative  ALLERGIC/IMMUNOLOGIC:  negative  NEUROLOGICAL:  negative  BEHAVIOR/PSYCH: negative    OBJECTIVE    Vitals:  /70   Pulse 68   LMP  (LMP Unknown)       General appearance:  awake, alert, cooperative, no apparent distress, and appears stated age  Neurologic:  Awake, alert, oriented to name, place and time.     Lungs:  No increased work of breathing, good air exchange  Abdomen: Soft, non tender, gravid, consistent with her gestational age  Cervix ft/50/ -1 non vertex    Fetal heart rate:         Baseline Heart Rate:  140        Accelerations:  none       Decelerations:  absent       Variability:  moderate    Contraction frequency: q0 minutes    ASSESSMENT:    Narcotics withdrawal  Unknown gest age    Plan:   Initial prenatal labs  MFM for iv drug use and unkown dates

## 2023-01-16 NOTE — PROGRESS NOTES
presents via police escort with c/o back pain that started at 2330. Has not had prenatal care this pregnancy. Unsure of gestational age. Will not state when last menstrual cycle was. States she has felt fetal movement during this pregnancy but hasn't felt this baby move in a week. Reports clear LOF. Unable to perform amnisure at this time due to patient movement and refusal to answer RNs questions. Last child was born 2022 vaginally. Admits to heroin and fentanyl use during pregnancy and admits to using both yesterday. Unable to obtain thorough health hx on patient due to patients unwillingness to answer RNs questions. Placed on [facility]. House attending notified.

## 2023-01-16 NOTE — PROGRESS NOTES
Patient having symptoms of withdrawal. Reports body aches 8/10. Patient restless frequently shifting in bed. Patient appears anxious. Denies LOF and VB. Decreased fetal movement.

## 2023-01-16 NOTE — CONSULTS
Ender Underwood, DO  [address]     RE:  Nicole Pepper  : 1985   AGE: 30 y.o. This report has been created using voice recognition software. It may contain errors which are inherent in voice recognition technology. Dear Dr. Isabel Lucia:    Dave Paiz had a consultation today for the following indications:    Patient Active Problem List   Diagnosis    No prenatal care in current pregnancy in third trimester    Withdrawal from opioids [facility])    Positive pregnancy test    Positive urine drug screen     Dave Paiz is a 30 y.o. female, who is G6(2,2,1,4). She has an Estimated Date of Delivery: 2023. She is currently 30 weeks 3 days gestation based on her ultrasound assessment performed on 2023. Biometric measurements at this late gestational age and not reliable indicators of gestational age. No other dating parameters were available. The patient has had no prenatal care. The patient was admitted with a history of no prenatal care, with a history of fentanyl, heroin and cocaine abuse during the course of her pregnancy. She was reported as having opioid withdrawal symptomatology on admission. The patient could not quantitate the amount of drugs she has been using daily but states she is using it as often as she can afford to use. She had previously been on the methadone maintenance program.  She stated that she was given 1 dose of Subutex prior to leaving the [facility] but did not know the dose. She is currently incarcerated at the [facility]. The patient stated she had not felt fetal movement for approximately 1 week prior to admission. The patient had a negative AmniSure assessment on admission. The fetal heart rate tracing shows moderate variability with an occasional variable deceleration. The tracing was category 2. The patient is of advanced maternal age.  I advised the patient of her age related risk of having a fetus with any karyotype abnormality of 1:135. Her age related risk for having a fetus with Down syndrome is 1:296. This is based on the [address]. Her background risk of having a fetus with any congenital abnormality is 3% to 5%. Her background risk of pregnancy loss is 1% to 2%. A fetal ultrasound assessment was performed on 1/16/2023. A detailed report is included in the EMR under the imaging tab. A living solares intrauterine fetus was identified in the cephalic presentation, with normal fetal heart motion and normal fetal motion noted. The placenta was anterior. The amniotic fluid index was 19 cm. The biometric measurements were consistent with an estimated gestational age of 31 weeks 3 days. Biometric measurements at this late gestational age and not reliable indicators of gestational age. The estimated fetal weight was 1669 grams which places the fetus at the 61st growth percentile for the gestational age indicated. Visualization of the fetal anatomy was limited secondary to the fetal position. The biophysical profile and cord Doppler studies were both reassuring. There was no evidence of absence, or reversal of end-diastolic flow.                  GENETIC SCREENING/TERATOLOGY COUNSELING              (Includes patient, FTB, and any affected family members)    Patient Age > 35 Years YES   Thalassemia ( MVC<80) NO   Congential Heart Defect NO   Neural Tube Defect NO   Robert-Sachs NO   Sickle Cell Disease NO   Sickle Cell Trait NO   Sickle C Disease or Trait NO   Hemophilia NO   Muscular Dystrophy NO   Cystic Fibrosis NO   Creek Disease NO   Autism NO   Mental Retardation NO   History of Fragile X NO   Maternal Diabetes NO   Other Genetic Disease or Syndrome NO   Previous Child With Congenital Abnormality Not Listed NO   Recreational Drugs YES                                                                 INFECTION HISTORY     HEPATITIS IMMUNIZED:  NO   HEPATITIS INFECTION:  NO   EXPOSURE TO TB NO   GENITAL HERPES NO   PARVOVIRUS B-19 NO   CHICKEN POX  NO   MEASLES NO   HIV NO     OB History    Para Term  AB Living   5 4 1 1   4   SAB IAB Ectopic Molar Multiple Live Births           0 3      # Outcome Date GA Lbr Altaf/2nd Weight Sex Delivery Anes PTL Lv   5 Current            4  22 34w0d  5 lb 15.6 oz (2.71 kg) F Vag-Spont None  RAUDEL      Complications: Precipitous Labor, No prenatal care in current pregnancy   3 Term 20 39w0d / 01:30 7 lb 9.7 oz (3.45 kg) M Vag-Spont None N RAUDEL   2 Para 12    F Vag-Spont None     1 Para 06    F Vag-Spont   RAUDEL     PAST GYNECOLOGICAL  HISTORY:  Negative for abnormal pap smears. Negative for sexually transmitted diseases. Negative for cervical LEEP / conization /cryosurgery. Negative for uterine surgery. Negative for ovarian or tubal surgery. Past Medical History:   Diagnosis Date    Asthma     childhood sports induced    Bipolar affective disorder, current episode depressed ([facility]     Drug abuse ([facility]     Mitral valve prolapse     PTSD (post-traumatic stress disorder)        Past Surgical History:   Procedure Laterality Date    ELBOW SURGERY Left     childhood       Allergies   Allergen Reactions    Naloxone     Tylenol [Acetaminophen] Hives and Swelling       Current Facility-Administered Medications:     ondansetron (ZOFRAN) injection 4 mg, 4 mg, IntraVENous, Q6H PRN, Madalyn  V, DO, 4 mg at 23 1503    Social History     Tobacco Use    Smoking status: Every Day     Packs/day: 0.25     Types: Cigarettes    Smokeless tobacco: Never   Substance Use Topics    Alcohol use: Not Currently     Comment: occasionally     FAMILY MEDICAL HISTORY:   Negative for congenital abnormalities, autism, genetic disease and mental retardation, not listed above.      Review of Systems :   CONSTITUTIONAL : No fever, no chills   HEENT : No headache, no visual changes, no rhinorrhea, no sore throat   CARDIOVASCULAR : No pain, no palpitations, no edema   RESPIRATORY : No pain, no shortness of breath   GASTROINTESTINAL : No N/V, no D/C, no abdominal pain   GENITOURINARY : No dysuria, hematuria and no incontinence   MUSCULOSKELETAL : No myalgia, No back pain  NEUROLOGICAL : No numbness, no tingling, no tremors. No history of seizures  ALL OTHER SYSTEMS WERE REPORTED AS NEGATIVE. PERTINENT PHYSICAL EXAMINATION:   Patient Vitals for the past 24 hrs:   BP Temp Temp src Pulse Resp   01/16/23 0806 (!) 145/68 98.3 °F (36.8 °C) Oral 83 20   01/16/23 0258 122/70 -- -- 68 --     GENERAL:   The patient is a well developed, female who is alert cooperative and oriented times three in no acute distress. HEENT:  Normo cephalic and atraumatic. No facial edema. ABDOMEN:   Her uterus is gravid. She had no complaint of abdominal pain or tenderness. The fetal heart rate is 150 bpm. The fetus is in the cephalic presentation which was confirmed by the ultrasound assessment. EXTREMITIES:  No peripheral edema is noted. PELVIC EXAMINATION:  The cervical length was 39.7 mm, without funneling of the amniotic membranes.      Admission on 01/16/2023   Component Date Value Ref Range Status    WBC 01/16/2023 9.4  4.5 - 11.5 E9/L Final    RBC 01/16/2023 3.87  3.50 - 5.50 E12/L Final    Hemoglobin 01/16/2023 11.9  11.5 - 15.5 g/dL Final    Hematocrit 01/16/2023 34.8  34.0 - 48.0 % Final    MCV 01/16/2023 89.9  80.0 - 99.9 fL Final    MCH 01/16/2023 30.7  26.0 - 35.0 pg Final    MCHC 01/16/2023 34.2  32.0 - 34.5 % Final    RDW 01/16/2023 13.4  11.5 - 15.0 fL Final    Platelets 64/49/5080 205  130 - 450 E9/L Final    MPV 01/16/2023 10.3  7.0 - 12.0 fL Final    Neutrophils % 01/16/2023 86.2 (A)  43.0 - 80.0 % Final    Immature Granulocytes % 01/16/2023 0.6  0.0 - 5.0 % Final    Lymphocytes % 01/16/2023 10.2 (A)  20.0 - 42.0 % Final    Monocytes % 01/16/2023 2.6  2.0 - 12.0 % Final    Eosinophils % 01/16/2023 0.2  0.0 - 6.0 % Final    Basophils % 01/16/2023 0.2  0.0 - 2.0 % Final    Neutrophils Absolute 01/16/2023 8. 10 (A)  1.80 - 7.30 E9/L Final    Immature Granulocytes # 01/16/2023 0.06  E9/L Final    Lymphocytes Absolute 01/16/2023 0.96 (A)  1.50 - 4.00 E9/L Final    Monocytes Absolute 01/16/2023 0.24  0.10 - 0.95 E9/L Final    Eosinophils Absolute 01/16/2023 0.02 (A)  0.05 - 0.50 E9/L Final    Basophils Absolute 01/16/2023 0.02  0.00 - 0.20 E9/L Final    Amphetamine Screen, Urine 01/16/2023 NOT DETECTED  Negative <1000 ng/mL Final    Barbiturate Screen, Ur 01/16/2023 NOT DETECTED  Negative < 200 ng/mL Final    Benzodiazepine Screen, Urine 01/16/2023 NOT DETECTED  Negative < 200 ng/mL Final    Cannabinoid Scrn, Ur 01/16/2023 NOT DETECTED  Negative < 50ng/mL Final    Cocaine Metabolite Screen, Urine 01/16/2023 POSITIVE (A)  Negative < 300 ng/mL Final    Opiate Scrn, Ur 01/16/2023 NOT DETECTED  Negative < 300ng/mL Final    PCP Screen, Urine 01/16/2023 NOT DETECTED  Negative < 25 ng/mL Final    Methadone Screen, Urine 01/16/2023 NOT DETECTED  Negative <300 ng/mL Final    Oxycodone Urine 01/16/2023 NOT DETECTED  Negative <100 ng/mL Final    FENTANYL SCREEN, URINE 01/16/2023 POSITIVE (A)  Negative <1 ng/mL Final    Drug Screen Comment: 01/16/2023 see below   Final    Amnisure, POC 01/16/2023 Negative  Negative Final    Lot Number 01/16/2023 [id number]   Final    Positive QC Pass/Fail 01/16/2023 Pass   Final    Negative QC Pass/Fail 01/16/2023 Pass   Final    Source 01/16/2023 Urine   Final    ABO/Rh 01/16/2023 A POS   Final    Antibody Screen 01/16/2023 NEG   Final    Comment 01/16/2023 see below   Final     IMPRESSION:  1.  IUP at 30 weeks 3 days Estimated Date of Delivery: 3/24/2023 based on ultrasound from 1/16/2023.  2.  No prenatal care currently incarcerated. 3.  Positive urine drug screen for fentanyl and cocaine on admission 1/16/2023  4.   Biometric measurements at this late gestational age are not reliable indicators of gestational age. 5.  Limited visualization of fetal anatomy on ultrasound assessment 1/16/2023  6. Reassuring biophysical profile and cord Doppler testing 1/16/2023  7. Negative AmniSure assessment on admission 1/16/2023  8. Elevated blood pressure of 145/68 at 0806 hrs. 1/16/2023  Diet. Currently incarcerated    PLAN:  The patient is currently admitted for management secondary to opioid withdrawal in pregnancy. Consultation with internal medicine has been requested regarding opioid maintenance therapy. I personally spoke with the internal medicine specialist on call regarding the patient's current clinical condition and the recommendations for opioid maintenance therapy. Consultation with psychiatry will be obtained. Continuous fetal heart rate and uterine contraction monitoring should be utilized for now. DVT prophylaxis should be utilized throughout the patient's admission. Laboratory results are pending regarding the patient's status for hepatitis C, chlamydia, gonorrhea, GBS, and CMP results. The patient should be observed for evidence of gestational hypertension/preeclampsia. Further evaluation and management will be dependent on the results of the patient's testing and her clinical presentation. If you have any questions regarding her management, please contact me at your convenience and thank you for allowing me to participate in her care.     Sincerely,        Landen Helton MD, MS, Sailaja Hudson, RDCS, RDMS, RVT  Director [address] [place] [address]  [phone]

## 2023-01-16 NOTE — CONSULTS
[address] [facility]      Reason for Consult:  Medical management    History of Present Illness:      30year old female presents to the hospital at around 30 week gestation. She states that she has not felt fetal movement for about 1 week. She states that she knew that she was pregnant just a few weeks ago. Patient appears to be agitated. She complains of anxiety. Apparently there were 2 police present as well as she came from shelter. Informant(s) for H&P:  Patient, EMR    REVIEW OF SYSTEMS:  Complete ROS performed with patient, pertinent positives and negatives are listed in the HPI. PMH:  Past Medical History:   Diagnosis Date    Asthma     childhood sports induced    Bipolar affective disorder, current episode depressed ([facility] [address]     Drug abuse ([facility] [facility]     Mitral valve prolapse     PTSD (post-traumatic stress disorder)        Surgical History:  Past Surgical History:   Procedure Laterality Date    ELBOW SURGERY Left     childhood       Medications Prior to Admission:    Prior to Admission medications    Medication Sig Start Date End Date Taking? Authorizing Provider   ibuprofen (ADVIL;MOTRIN) 800 MG tablet Take 1 tablet by mouth every 8 hours as needed for Pain  Patient not taking: Reported on 1/16/2023 2/7/22   JACK Martin CNM   Prenatal Vit-Fe Fumarate-FA (PRENATAL PLUS/IRON) 27-1 MG TABS tablet Take 1 tablet by mouth daily  Patient not taking: Reported on 1/16/2023 2/7/22   JACK Martin CNM   ondansetron (ZOFRAN ODT) 4 MG disintegrating tablet Take 1 tablet by mouth every 8 hours as needed for Nausea or Vomiting  Patient not taking: Reported on 1/16/2023 2/7/22   Harris Mcardle, MD       Allergies:    Naloxone and Tylenol [acetaminophen]    Social History:    reports that she has been smoking cigarettes. She has been smoking an average of .25 packs per day.  She has never used smokeless tobacco. She reports that she does not currently use alcohol. She reports current drug use. Drugs: Marijuana (Weed) and IV.    Family History:       Problem Relation Age of Onset    Breast Cancer Paternal Grandmother     Breast Cancer Maternal Grandmother          PHYSICAL EXAM:  Vitals:  BP (!) 145/68   Pulse 83   Temp 98.3 °F (36.8 °C) (Oral)   Resp 20   LMP  (LMP Unknown)   General Appearance: alert and oriented to person, place and time, well developed and well- nourished, in no acute distress  Skin: warm and dry, no rash or erythema  Head: normocephalic and atraumatic  Eyes: pupils equal, round, and reactive to light, extraocular eye movements intact, conjunctivae normal  ENT: tympanic membrane, external ear and ear canal normal bilaterally, nose without deformity, nasal mucosa and turbinates normal without polyps  Neck: supple and non-tender without mass, no thyromegaly or thyroid nodules, no cervical lymphadenopathy  Pulmonary/Chest: clear to auscultation bilaterally- no wheezes, rales or rhonchi, normal air movement, no respiratory distress  Cardiovascular: normal rate, regular rhythm, normal S1 and S2, no murmurs, rubs, clicks, or gallops, distal pulses intact, no carotid bruits  Abdomen: soft, non-tender, non-distended, normal bowel sounds, no masses or organomegaly  Extremities: no cyanosis, clubbing or edema  Musculoskeletal: normal range of motion, no joint swelling, deformity or tenderness  Neurologic: reflexes normal and symmetric, no cranial nerve deficit, gait, coordination and speech normal      LABS:  No results for input(s): NA, K, CL, CO2, BUN, CREATININE, GLUCOSE, CALCIUM in the last 72 hours.    Recent Labs     01/16/23  0900   WBC 9.4   RBC 3.87   HGB 11.9   HCT 34.8   MCV 89.9   MCH 30.7   MCHC 34.2   RDW 13.4      MPV 10.3       No results for input(s): POCGLU in the last 72 hours.        Radiology:   US OB 14 PLUS WEEKS SINGLE OR FIRST GESTATION   Final Result      US FETAL BIOPHYSICAL PROFILE WO NON STRESS TESTING  Final Result      US DOPPLER FETAL UMBILICAL ARTERY   Final Result          EKG:     Pending    ASSESSMENT:      Principal Problem:    Withdrawal from opioids ([address]  Active Problems:    Positive pregnancy test    Positive urine drug screen    No prenatal care in current pregnancy in third trimester  Resolved Problems:    * No resolved hospital problems. *      PLAN:    Polysubstance abuse - UDS was positive for cocaine and fentanyl. She has been started on IVF. Will order troponin as well as ekg. Unable to start a new prescription for methadone or subutex  Hypokalemia - Kdur 40 mEq po times 1 and will check magnesium levels  Third trimester pregnancy    Thank you very much for this interesting consult and we will continue to follow along. Feel free to call with any questions at [phone]. Electronically signed by Susan Castillo DO on 1/16/2023 at 4:16 PM    NOTE: This report was transcribed using voice recognition software. Every effort was made to ensure accuracy; however, inadvertent computerized transcription errors may be present.

## 2023-01-17 ENCOUNTER — ANCILLARY PROCEDURE (OUTPATIENT)
Dept: OBGYN CLINIC | Age: 38
DRG: 566 | End: 2023-01-17
Payer: MEDICAID

## 2023-01-17 VITALS
RESPIRATION RATE: 17 BRPM | HEART RATE: 104 BPM | TEMPERATURE: 98.4 F | SYSTOLIC BLOOD PRESSURE: 133 MMHG | DIASTOLIC BLOOD PRESSURE: 68 MMHG

## 2023-01-17 LAB
BENZOYLECGONINE, QUANTITATIVE, URINE: >1000
BUPRENORPHINE, QUANTITATIVE, URINE: 125
COMMENT: NORMAL
EKG ATRIAL RATE: 90 BPM
EKG P AXIS: 53 DEGREES
EKG P-R INTERVAL: 128 MS
EKG Q-T INTERVAL: 348 MS
EKG QRS DURATION: 70 MS
EKG QTC CALCULATION (BAZETT): 425 MS
EKG R AXIS: 67 DEGREES
EKG T AXIS: 69 DEGREES
EKG VENTRICULAR RATE: 90 BPM
FENTANYL, URN, QUANT: 422.3
HEPATITIS B SURFACE ANTIGEN INTERPRETATION: NORMAL
HEPATITIS C ANTIBODY INTERPRETATION: REACTIVE
NORBUPRENORPHINE, QUANTITATIVE, URINE: 458.1
NORFENTANYL, URN, QUANT: >1000
RPR: NORMAL
RUBELLA ANTIBODY IGG: NORMAL

## 2023-01-17 PROCEDURE — 93010 ELECTROCARDIOGRAM REPORT: CPT | Performed by: INTERNAL MEDICINE

## 2023-01-17 PROCEDURE — 6360000002 HC RX W HCPCS

## 2023-01-17 PROCEDURE — 76819 FETAL BIOPHYS PROFIL W/O NST: CPT | Performed by: OBSTETRICS & GYNECOLOGY

## 2023-01-17 PROCEDURE — 6370000000 HC RX 637 (ALT 250 FOR IP): Performed by: PSYCHIATRY & NEUROLOGY

## 2023-01-17 PROCEDURE — 6360000002 HC RX W HCPCS: Performed by: PSYCHIATRY & NEUROLOGY

## 2023-01-17 PROCEDURE — 99238 HOSP IP/OBS DSCHRG MGMT 30/<: CPT | Performed by: STUDENT IN AN ORGANIZED HEALTH CARE EDUCATION/TRAINING PROGRAM

## 2023-01-17 PROCEDURE — 76820 UMBILICAL ARTERY ECHO: CPT | Performed by: OBSTETRICS & GYNECOLOGY

## 2023-01-17 RX ORDER — CLONIDINE HYDROCHLORIDE 0.1 MG/1
0.1 TABLET ORAL 3 TIMES DAILY PRN
Status: DISCONTINUED | OUTPATIENT
Start: 2023-01-17 | End: 2023-01-17 | Stop reason: HOSPADM

## 2023-01-17 RX ORDER — TRAZODONE HYDROCHLORIDE 50 MG/1
50 TABLET ORAL NIGHTLY
Status: DISCONTINUED | OUTPATIENT
Start: 2023-01-17 | End: 2023-01-17 | Stop reason: HOSPADM

## 2023-01-17 RX ORDER — IBUPROFEN 600 MG/1
600 TABLET ORAL EVERY 8 HOURS PRN
Status: DISCONTINUED | OUTPATIENT
Start: 2023-01-17 | End: 2023-01-17 | Stop reason: HOSPADM

## 2023-01-17 RX ORDER — ONDANSETRON 4 MG/1
4 TABLET, ORALLY DISINTEGRATING ORAL EVERY 8 HOURS PRN
Status: DISCONTINUED | OUTPATIENT
Start: 2023-01-17 | End: 2023-01-17 | Stop reason: HOSPADM

## 2023-01-17 RX ORDER — CLONAZEPAM 0.5 MG/1
1 TABLET ORAL 2 TIMES DAILY PRN
Status: DISCONTINUED | OUTPATIENT
Start: 2023-01-17 | End: 2023-01-17 | Stop reason: HOSPADM

## 2023-01-17 RX ORDER — BUPRENORPHINE HYDROCHLORIDE 8 MG/1
8 TABLET SUBLINGUAL 2 TIMES DAILY
Status: DISCONTINUED | OUTPATIENT
Start: 2023-01-17 | End: 2023-01-17 | Stop reason: HOSPADM

## 2023-01-17 RX ORDER — BUPRENORPHINE HYDROCHLORIDE 8 MG/1
8 TABLET SUBLINGUAL 2 TIMES DAILY
Qty: 14 TABLET | Refills: 0 | Status: SHIPPED | OUTPATIENT
Start: 2023-01-17 | End: 2023-01-24

## 2023-01-17 RX ADMIN — TRAZODONE HYDROCHLORIDE 50 MG: 50 TABLET ORAL at 00:53

## 2023-01-17 RX ADMIN — CLONAZEPAM 1 MG: 0.5 TABLET ORAL at 02:44

## 2023-01-17 RX ADMIN — BUPRENORPHINE HCL 8 MG: 8 TABLET SUBLINGUAL at 10:54

## 2023-01-17 RX ADMIN — CLONAZEPAM 1 MG: 0.5 TABLET ORAL at 14:41

## 2023-01-17 RX ADMIN — ONDANSETRON 4 MG: 4 TABLET, ORALLY DISINTEGRATING ORAL at 13:18

## 2023-01-17 RX ADMIN — PROMETHAZINE HYDROCHLORIDE 25 MG: 25 INJECTION INTRAMUSCULAR; INTRAVENOUS at 04:31

## 2023-01-17 RX ADMIN — ONDANSETRON 4 MG: 4 TABLET, ORALLY DISINTEGRATING ORAL at 02:44

## 2023-01-17 RX ADMIN — IBUPROFEN 600 MG: 600 TABLET, FILM COATED ORAL at 00:45

## 2023-01-17 RX ADMIN — CLONIDINE HYDROCHLORIDE 0.1 MG: 0.1 TABLET ORAL at 00:54

## 2023-01-17 ASSESSMENT — PAIN SCALES - GENERAL: PAINLEVEL_OUTOF10: 10

## 2023-01-17 ASSESSMENT — PAIN DESCRIPTION - LOCATION: LOCATION: ABDOMEN

## 2023-01-17 ASSESSMENT — PAIN DESCRIPTION - DESCRIPTORS: DESCRIPTORS: ACHING

## 2023-01-17 NOTE — PROGRESS NOTES
Pt on efm pt restless and had vomitted in the bathroom. Pt flipping side to side. Says she still feels bad.

## 2023-01-17 NOTE — CARE COORDINATION
SW Discharge Planning   SW received consult for \" withdrawals\"     SW received report from nurse reporting that patient is currently incarcerated and received drugs from an unknown person while in senior care. Per Nurse, patient will be remaining incarcerated, thus inpatient and out patient treatment at this time is not an option. SW did note that patient was given one dose of subutex by hospital.  At this time, due to patient going back to senior care SW will be unable to assist with treatment. Once patient delivers, SW will need to complete a [facility] ( [phone]) referral and will attempt again to offer assistance.      Electronically signed by DUNIA Hickman on 1/17/2023 at 8:24 AM

## 2023-01-17 NOTE — PROGRESS NOTES
Called dr Liseth Jurado. Pt still restless, vomitting. Orders received.  Told him we are unable to get iv access

## 2023-01-17 NOTE — PROGRESS NOTES
Discussed with Dr Fay Sylvester  If MFM ok to d/c back to incarceration he is ok for d/c too.     JACK Christianson - BRIAN

## 2023-01-17 NOTE — PROGRESS NOTES
[facility] Progress Note    Admitting Date and Time: 1/16/2023  2:37 AM  Admit Dx: [facility] [F11.93]  Positive pregnancy test [Z32.01]    Subjective:  Patient is being followed for Withdrawal from opioids ([facility] [F11.93]  Positive pregnancy test [Z32.01]       ROS: denies fever, chills, cp, sob, n/v, HA unless stated above. traZODone  50 mg Oral Nightly    buprenorphine  8 mg SubLINGual BID    prenatal vitamin  1 tablet Oral Daily     ibuprofen, 600 mg, Q8H PRN  cloNIDine, 0.1 mg, TID PRN  clonazePAM, 1 mg, BID PRN  ondansetron, 4 mg, Q8H PRN  ondansetron, 4 mg, Q6H PRN  promethazine, 25 mg, Q6H PRN         Objective:    /68   Pulse (!) 104   Temp 98.4 °F (36.9 °C) (Oral)   Resp 17   LMP  (LMP Unknown)     Did not do physical exam as patient was discharged before I could round on her        Recent Labs     01/16/23  1745      K 3.5      CO2 20*   BUN 6   CREATININE 0.5   GLUCOSE 87   CALCIUM 8.5*       Recent Labs     01/16/23  0900   WBC 9.4   RBC 3.87   HGB 11.9   HCT 34.8   MCV 89.9   MCH 30.7   MCHC 34.2   RDW 13.4      MPV 10.3       Assessment:    Principal Problem:    Withdrawal from opioids ([facility])  Active Problems:    Positive pregnancy test    Positive urine drug screen    No prenatal care in current pregnancy in third trimester  Resolved Problems:    * No resolved hospital problems. *      Plan:    Patient was discharged before I could round on the patient. However based on labs and vitals she appears to be stable. Follow up with PCP in 1-2 weeks. NOTE: This report was transcribed using voice recognition software. Every effort was made to ensure accuracy; however, inadvertent computerized transcription errors may be present.   Electronically signed by Erlin Gil DO on 1/17/2023 at 4:31 PM

## 2023-01-17 NOTE — PROGRESS NOTES
Psychiatric NP saw patient ordered subutex bid. Spoke with Dr. Myranda Moya and as long as the patient ultrasound is ok the she can be discharged back to detention.

## 2023-01-17 NOTE — PROGRESS NOTES
Pt vomitting 150 brown fluid. Pt still up and down and restless. When I asked pt if she felt any better.  Pt just stared at me

## 2023-01-17 NOTE — PROGRESS NOTES
Patient Active Problem List   Diagnosis    No prenatal care in current pregnancy in third trimester    Withdrawal from opioids ([facility] [address]    Positive pregnancy test    Positive urine drug screen     Escobar Sosa is a 30 y.o. female, who is G6(2,2,1,4). She has an Estimated Date of Delivery: March 24, 2023. She is currently 30 weeks 4 days gestation based on her ultrasound assessment performed on 1/16/2023. Biometric measurements at this late gestational age and not reliable indicators of gestational age. No other dating parameters were available. The patient has had no prenatal care. The patient was admitted with a history of no prenatal care, with a history of fentanyl, heroin and cocaine abuse during the course of her pregnancy. She was reported as having opioid withdrawal symptomatology on admission. The patient could not quantitate the amount of drugs she has been using daily but states she is using it as often as she can afford to use. She had previously been on the methadone maintenance program.  She stated that she was given 1 dose of Subutex prior to leaving the skilled nursing but did not know the dose. She is currently incarcerated at the [facility]. The patient stated she had not felt fetal movement for approximately 1 week prior to admission. The patient had a negative AmniSure assessment on admission. The fetal heart rate tracing shows moderate variability with an occasional variable deceleration. The tracing was category 2. The patient is of advanced maternal age. I advised the patient of her age related risk of having a fetus with any karyotype abnormality of 1:135. Her age related risk for having a fetus with Down syndrome is 1:296. This is based on the [address]. Her background risk of having a fetus with any congenital abnormality is 3% to 5%. Her background risk of pregnancy loss is 1% to 2%.      A fetal ultrasound assessment was performed on 2023. A detailed report is included in the EMR under the imaging tab. A living solares intrauterine fetus was identified in the cephalic presentation, with normal fetal heart motion and normal fetal motion noted. The placenta was anterior. The amniotic fluid index was 19 cm. The biometric measurements were consistent with an estimated gestational age of 31 weeks 3 days. Biometric measurements at this late gestational age and not reliable indicators of gestational age. The estimated fetal weight was 1669 grams which places the fetus at the 61st growth percentile for the gestational age indicated. Visualization of the fetal anatomy was limited secondary to the fetal position. The biophysical profile and cord Doppler studies were both reassuring. There was no evidence of absence, or reversal of end-diastolic flow.                  GENETIC SCREENING/TERATOLOGY COUNSELING              (Includes patient, FTB, and any affected family members)    Patient Age > 35 Years YES   Thalassemia ( MVC<80) NO   Congential Heart Defect NO   Neural Tube Defect NO   Robert-Sachs NO   Sickle Cell Disease NO   Sickle Cell Trait NO   Sickle C Disease or Trait NO   Hemophilia NO   Muscular Dystrophy NO   Cystic Fibrosis NO   Delano Disease NO   Autism NO   Mental Retardation NO   History of Fragile X NO   Maternal Diabetes NO   Other Genetic Disease or Syndrome NO   Previous Child With Congenital Abnormality Not Listed NO   Recreational Drugs YES                                                                 INFECTION HISTORY     HEPATITIS IMMUNIZED:  NO   HEPATITIS INFECTION:  NO   EXPOSURE TO TB NO   GENITAL HERPES NO   PARVOVIRUS B-19 NO   CHICKEN POX  NO   MEASLES NO   HIV NO     OB History    Para Term  AB Living   5 4 1 1   4   SAB IAB Ectopic Molar Multiple Live Births           0 3      # Outcome Date GA Lbr Altaf/2nd Weight Sex Delivery Anes PTL Lv   5 Current            4  22 34w0d  5 lb 15.6 oz (2.71 kg) F Vag-Spont None  RAUDEL      Complications: Precipitous Labor, No prenatal care in current pregnancy   3 Term 20 39w0d / 01:30 7 lb 9.7 oz (3.45 kg) M Vag-Spont None N RAUDEL   2 Para 12    F Vag-Spont None     1 Para 06    F Vag-Spont   RAUDEL     PAST GYNECOLOGICAL  HISTORY:  Negative for abnormal pap smears. Negative for sexually transmitted diseases. Negative for cervical LEEP / conization /cryosurgery. Negative for uterine surgery. Negative for ovarian or tubal surgery.      Past Medical History:   Diagnosis Date    Asthma     childhood sports induced    Bipolar affective disorder, current episode depressed ([facility] [address]     Drug abuse ([facility] [address]     Mitral valve prolapse     PTSD (post-traumatic stress disorder)        Past Surgical History:   Procedure Laterality Date    ELBOW SURGERY Left     childhood       Allergies   Allergen Reactions    Naloxone     Tylenol [Acetaminophen] Hives and Swelling       Current Facility-Administered Medications:     ibuprofen (ADVIL;MOTRIN) tablet 600 mg, 600 mg, Oral, Q8H PRN, Romel Vargas MD, 600 mg at 23 0045    cloNIDine (CATAPRES) tablet 0.1 mg, 0.1 mg, Oral, TID PRN, Romel Vargas MD, 0.1 mg at 23 0054    traZODone (DESYREL) tablet 50 mg, 50 mg, Oral, Nightly, Romel Vargas MD, 50 mg at 23 0053    clonazePAM (KLONOPIN) tablet 1 mg, 1 mg, Oral, BID PRN, Romel Vargas MD, 1 mg at 23 0244    ondansetron (ZOFRAN-ODT) disintegrating tablet 4 mg, 4 mg, Oral, Q8H PRN, Romel Vargas MD, 4 mg at 23 0244    buprenorphine (SUBUTEX) SL tablet 8 mg, 8 mg, SubLINGual, BID, JACK Fernandez - CNP, 8 mg at 23 1054    ondansetron (ZOFRAN) injection 4 mg, 4 mg, IntraVENous, Q6H PRN, Stefanie Collet V, DO, 4 mg at 23    0.9 % sodium chloride infusion, , IntraVENous, Continuous, Beulah Cloud DO, Last Rate: 100 mL/hr at 23, New Bag at 23    prenatal vitamin 27-1 MG tablet 1 tablet, 1 tablet, Oral, Daily, Beulah CloudDO, 1 tablet at 01/16/23 0144    promethazine (PHENERGAN) injection 25 mg, 25 mg, IntraMUSCular, Q6H PRN, Cassie Yung MD, 25 mg at 01/17/23 0431    Social History     Tobacco Use    Smoking status: Every Day     Packs/day: 0.25     Types: Cigarettes    Smokeless tobacco: Never   Substance Use Topics    Alcohol use: Not Currently     Comment: occasionally     FAMILY MEDICAL HISTORY:   Negative for congenital abnormalities, autism, genetic disease and mental retardation, not listed above. Review of Systems :   CONSTITUTIONAL : No fever, no chills   HEENT : No headache, no visual changes, no rhinorrhea, no sore throat   CARDIOVASCULAR : No pain, no palpitations, no edema   RESPIRATORY : No pain, no shortness of breath   GASTROINTESTINAL : No N/V, no D/C, no abdominal pain   GENITOURINARY : No dysuria, hematuria and no incontinence   MUSCULOSKELETAL : No myalgia, No back pain  NEUROLOGICAL : No numbness, no tingling, no tremors. No history of seizures  ALL OTHER SYSTEMS WERE REPORTED AS NEGATIVE. PERTINENT PHYSICAL EXAMINATION:   Patient Vitals for the past 24 hrs:   BP Temp Temp src Pulse Resp   01/17/23 1148 120/71 -- -- (!) 109 --   01/17/23 1002 129/65 -- -- (!) 110 18   01/17/23 0857 134/75 -- -- 87 18   01/17/23 0800 136/80 98.4 °F (36.9 °C) Oral 88 18   01/17/23 0246 119/61 -- -- 72 --   01/17/23 0054 (!) 147/87 -- -- -- --   01/17/23 0015 (!) 147/86 -- -- 96 --   01/16/23 2306 (!) 146/81 -- -- (!) 106 --   01/16/23 2052 124/68 -- -- 98 --   01/16/23 1508 115/64 -- -- 96 --     GENERAL:   The patient is a well developed, female who is alert cooperative and oriented times three in no acute distress. HEENT:  Normo cephalic and atraumatic. No facial edema. ABDOMEN:   Her uterus is gravid. She had no complaint of abdominal pain or tenderness.  The fetal heart rate is 150 bpm. The fetus is in the cephalic presentation which was confirmed by the ultrasound assessment. EXTREMITIES:  No peripheral edema is noted. PELVIC EXAMINATION:  The cervical length was 39.7 mm, without funneling of the amniotic membranes. Admission on 01/16/2023   Component Date Value Ref Range Status    WBC 01/16/2023 9.4  4.5 - 11.5 E9/L Final    RBC 01/16/2023 3.87  3.50 - 5.50 E12/L Final    Hemoglobin 01/16/2023 11.9  11.5 - 15.5 g/dL Final    Hematocrit 01/16/2023 34.8  34.0 - 48.0 % Final    MCV 01/16/2023 89.9  80.0 - 99.9 fL Final    MCH 01/16/2023 30.7  26.0 - 35.0 pg Final    MCHC 01/16/2023 34.2  32.0 - 34.5 % Final    RDW 01/16/2023 13.4  11.5 - 15.0 fL Final    Platelets 41/71/7487 205  130 - 450 E9/L Final    MPV 01/16/2023 10.3  7.0 - 12.0 fL Final    Neutrophils % 01/16/2023 86.2 (A)  43.0 - 80.0 % Final    Immature Granulocytes % 01/16/2023 0.6  0.0 - 5.0 % Final    Lymphocytes % 01/16/2023 10.2 (A)  20.0 - 42.0 % Final    Monocytes % 01/16/2023 2.6  2.0 - 12.0 % Final    Eosinophils % 01/16/2023 0.2  0.0 - 6.0 % Final    Basophils % 01/16/2023 0.2  0.0 - 2.0 % Final    Neutrophils Absolute 01/16/2023 8. 10 (A)  1.80 - 7.30 E9/L Final    Immature Granulocytes # 01/16/2023 0.06  E9/L Final    Lymphocytes Absolute 01/16/2023 0.96 (A)  1.50 - 4.00 E9/L Final    Monocytes Absolute 01/16/2023 0.24  0.10 - 0.95 E9/L Final    Eosinophils Absolute 01/16/2023 0.02 (A)  0.05 - 0.50 E9/L Final    Basophils Absolute 01/16/2023 0.02  0.00 - 0.20 E9/L Final    Hep B S Ag Interp 01/16/2023 Non-Reactive  Non-Reactive Final    Amphetamine Screen, Urine 01/16/2023 NOT DETECTED  Negative <1000 ng/mL Final    Barbiturate Screen, Ur 01/16/2023 NOT DETECTED  Negative < 200 ng/mL Final    Benzodiazepine Screen, Urine 01/16/2023 NOT DETECTED  Negative < 200 ng/mL Final    Cannabinoid Scrn, Ur 01/16/2023 NOT DETECTED  Negative < 50ng/mL Final    Cocaine Metabolite Screen, Urine 01/16/2023 POSITIVE (A)  Negative < 300 ng/mL Final    Opiate Scrn, Ur 01/16/2023 NOT DETECTED  Negative < 300ng/mL Final    PCP Screen, Urine 01/16/2023 NOT DETECTED  Negative < 25 ng/mL Final    Methadone Screen, Urine 01/16/2023 NOT DETECTED  Negative <300 ng/mL Final    Oxycodone Urine 01/16/2023 NOT DETECTED  Negative <100 ng/mL Final    FENTANYL SCREEN, URINE 01/16/2023 POSITIVE (A)  Negative <1 ng/mL Final    Drug Screen Comment: 01/16/2023 see below   Final    Hep C Ab Interp 01/16/2023 REACTIVE (A)  Non-Reactive Final    RPR 01/16/2023 NON-REACTIVE  Non-reactive Final    Amnisure, POC 01/16/2023 Negative  Negative Final    Lot Number 01/16/2023 [id number]   Final    Positive QC Pass/Fail 01/16/2023 Pass   Final    Negative QC Pass/Fail 01/16/2023 Pass   Final    Source 01/16/2023 Urine   Final    ABO/Rh 01/16/2023 A POS   Final    Antibody Screen 01/16/2023 NEG   Final    BENZOYLECGONINE, QUANTITATIVE, URI* 01/16/2023 >1,000.0  Cutoff 50 ng/mL Final    Comment 01/16/2023 see below   Final    Fentanyl, Urine, Quant 01/16/2023 422.3  Cutoff 5 ng/mL Final    Norfentanyl, Urine, Quant 01/16/2023 >1,000.0  Cutoff 10 ng/mL Final    Sodium 01/16/2023 134  132 - 146 mmol/L Final    Potassium 01/16/2023 3.5  3.5 - 5.0 mmol/L Final    Chloride 01/16/2023 103  98 - 107 mmol/L Final    CO2 01/16/2023 20 (A)  22 - 29 mmol/L Final    Anion Gap 01/16/2023 11  7 - 16 mmol/L Final    Glucose 01/16/2023 87  74 - 99 mg/dL Final    BUN 01/16/2023 6  6 - 20 mg/dL Final    Creatinine 01/16/2023 0.5  0.5 - 1.0 mg/dL Final    Est, Glom Filt Rate 01/16/2023 >60  >=60 mL/min/1.73 Final    Calcium 01/16/2023 8.5 (A)  8.6 - 10.2 mg/dL Final    Total Protein 01/16/2023 5.9 (A)  6.4 - 8.3 g/dL Final    Albumin 01/16/2023 2.8 (A)  3.5 - 5.2 g/dL Final    Total Bilirubin 01/16/2023 0.4  0.0 - 1.2 mg/dL Final    Alkaline Phosphatase 01/16/2023 81  35 - 104 U/L Final    ALT 01/16/2023 17  0 - 32 U/L Final    AST 01/16/2023 17  0 - 31 U/L Final    Troponin, High Sensitivity 01/16/2023 <6  0 - 9 ng/L Final    Ventricular Rate 01/16/2023 90  BPM Final    Atrial Rate 01/16/2023 90  BPM Final    P-R Interval 01/16/2023 128  ms Final    QRS Duration 01/16/2023 70  ms Final    Q-T Interval 01/16/2023 348  ms Final    QTc Calculation (Bazett) 01/16/2023 425  ms Final    P Axis 01/16/2023 53  degrees Final    R Axis 01/16/2023 67  degrees Final    T Axis 01/16/2023 69  degrees Final    Magnesium 01/16/2023 2.0  1.6 - 2.6 mg/dL Final    Buprenorphine, Urine, Quantitative 01/16/2023 125.0  Cutoff 5 ng/mL Final    NORBUPRENORPHINE, QUANTITATIVE, UR* 01/16/2023 458. 1  Cutoff 10 ng/mL Final    test code 01/16/2023 HIV 1/2   Final    This Test Sent To 01/16/2023 [facility]   Final     IMPRESSION:    1.  IUP at 30 weeks 4 days Estimated Date of Delivery: 3/24/2023 based on ultrasound from 1/16/2023.    2.  No prenatal care currently incarcerated. 3.  Positive urine drug screen for fentanyl and cocaine on admission 1/16/2023    4. Biometric measurements at this late gestational age are not reliable indicators of gestational age. 5.  Limited visualization of fetal anatomy on ultrasound assessment 1/16/2023    6. Reassuring biophysical profile and cord Doppler testing 1/16/2023    7. Negative AmniSure assessment on admission 1/16/2023    8. Elevated blood pressure of 145/68 at 0806 hrs. 1/16/2023    9. Currently incarcerated  10. Following with psychiatry for Subutex maintenance therapy    PLAN:  The patient is currently admitted for management secondary to opioid withdrawal in pregnancy. Consultation with internal medicine has been requested regarding opioid maintenance therapy. I personally spoke with the internal medicine specialist on call regarding the patient's current clinical condition and the recommendations for opioid maintenance therapy. Consultation with psychiatry will be obtained. Continuous fetal heart rate and uterine contraction monitoring should be utilized for now.     DVT prophylaxis should be utilized throughout the patient's admission. Laboratory results are pending regarding the patient's status for hepatitis C, chlamydia, gonorrhea, GBS, and CMP results. The patient should be observed for evidence of gestational hypertension/preeclampsia. Further evaluation and management will be dependent on the results of the patient's testing and her clinical presentation. If you have any questions regarding her management, please contact me at your convenience and thank you for allowing me to participate in her care.     Ayah Marie MD, MS, Tonny Ernst, MARY BETH, RDMS, RVT  Director [address] [place] [address]  [phone]

## 2023-01-17 NOTE — DISCHARGE SUMMARY
Obstetric Discharge Summary    Admitting Diagnosis  Substance use disorder    Dave Paiz is a 30 y.o. female at 32w2d who was seen for withrdrawal from opioid. She was seen by psychiatry with plan made for treatment in intermediate and follow up. MFM saw patient and did BPP which was 8/8 and given recommendation for discharge back to intermediate  -establish prenatal care with local provider  -return precautions reviewed  -withdrawal per psych provider notes  Vitals:    01/17/23 1534   BP: 133/68   Pulse: (!) 104   Resp: 17   Temp:          CONSTITUTIONAL:  awake, alert, cooperative, no apparent distress  LUNGS:  No increased work of breathing, good air exchange, clear to auscultation bilaterally, no crackles or wheezing  CARDIOVASCULAR:  Normal apical impulse, regular rate and rhythm, normal S1 and S2, no S3 or S4, and no murmur noted  ABDOMEN:  Non tender, no rebound or guarding. Fundus firm  EXTREMETIES:  Minimal edema. Discharge: assisted    Discharge Diagnosis  Subatance use disorder    Discharge Information     Medication List        START taking these medications      buprenorphine 8 MG Subl SL tablet  Commonly known as: SUBUTEX  Place 1 tablet under the tongue in the morning and at bedtime for 7 days.  Max Daily Amount: 16 mg            ASK your doctor about these medications      ibuprofen 800 MG tablet  Commonly known as: ADVIL;MOTRIN  Take 1 tablet by mouth every 8 hours as needed for Pain     ondansetron 4 MG disintegrating tablet  Commonly known as: Zofran ODT  Take 1 tablet by mouth every 8 hours as needed for Nausea or Vomiting     Prenatal Plus/Iron 27-1 MG Tabs tablet  Take 1 tablet by mouth daily               Where to Get Your Medications        You can get these medications from any pharmacy    Bring a paper prescription for each of these medications  buprenorphine 8 MG Subl SL tablet         Discharge to: [facility] [place]

## 2023-01-17 NOTE — PROGRESS NOTES
Gave zofran 4mg ivp pt vomiting in trash can, restless up to br , c/o pain all over. Tried to get pt to pinpoint if was labor pain. Pt unable.  Pt agreed to be on efm

## 2023-01-17 NOTE — CONSULTS
PSYCHIATRY ATTENDING CONSULT    REASON FOR CONSULT: Opioid withdrawal    REQUESTING PHYSICIAN:  Dr. Tad Barahona: \"I am in pain. \"    HISTORY OF PRESENT ILLNESS:  Camelia Ray  is a 30 y.o. female who was admitted on 1/16/2023. Patient presented from a [facility] with police escort complaining of back pain. She stated that she has felt fetal movement during this pregnancy but has not felt this baby move in a week. She admits to heroin and fentanyl use during pregnancy and admits to using both on 1/15/2023. Psychiatry was consulted for opioid withdrawal.  Patient has had no prenatal treatment during pregnancy. Chart reviewed. Spoke with nursing. . Urine toxicology positive for cocaine and fentanyl. Upon assessment, patient is lying in bed. [place] at bedside. Bedside nurse also present at bedside during assessment. Patient is cooperative. She is superficially forthcoming with information. She currently reports withdrawal symptoms of nausea, vomiting, restlessness, difficulty sleeping, and chills. During assessment she is constantly shifting in bed unable to lie still. She has multiple episodes of emesis during assessment. She reports her last use of fentanyl was the day of admission. She reports to using 1/2 g or more every day for about a year. She reports seeing a psychiatrist in the past at [facility] and being diagnosed with bipolar disorder. She has not followed with psychiatric treatment \"in a while. \"  She currently denies depression or anxiety. She denies suicidal ideations, intent, or plan. She denies hallucinations, delusions, paranoia. Patient is not manic or psychotic. She reports her sleep is poor. Her appetite is also poor due to withdrawing. PAST PSYCHIATRIC HISTORY: She reports seeing a psychiatrist in the past at [facility] and being diagnosed with bipolar disorder.   She has not been following with psychiatric treatment \"in a while.\"  Denies history of suicide attempts. Denies history of inpatient psychiatric hospitalizations. PAST MEDICAL HISTORY:       Diagnosis Date    Asthma     childhood sports induced    Bipolar affective disorder, current episode depressed ([facility]     Drug abuse ([facility] [address]     Mitral valve prolapse     PTSD (post-traumatic stress disorder)      MEDICAL ROS: Pain, nausea, vomiting, restlessness, poor appetite, and chills. PAST SURGICAL HISTORY:       Procedure Laterality Date    ELBOW SURGERY Left     childhood     MEDICATIONS: Current Facility-Administered Medications: ibuprofen (ADVIL;MOTRIN) tablet 600 mg, 600 mg, Oral, Q8H PRN  cloNIDine (CATAPRES) tablet 0.1 mg, 0.1 mg, Oral, TID PRN  traZODone (DESYREL) tablet 50 mg, 50 mg, Oral, Nightly  clonazePAM (KLONOPIN) tablet 1 mg, 1 mg, Oral, BID PRN  ondansetron (ZOFRAN-ODT) disintegrating tablet 4 mg, 4 mg, Oral, Q8H PRN  buprenorphine (SUBUTEX) SL tablet 8 mg, 8 mg, SubLINGual, BID  ondansetron (ZOFRAN) injection 4 mg, 4 mg, IntraVENous, Q6H PRN  0.9 % sodium chloride infusion, , IntraVENous, Continuous  prenatal vitamin 27-1 MG tablet 1 tablet, 1 tablet, Oral, Daily  promethazine (PHENERGAN) injection 25 mg, 25 mg, IntraMUSCular, Q6H PRN    ALLERGIES:  Naloxone and Tylenol [acetaminophen]    FAMILY PSYCHIATRIC HISTORY:   Unknown family psychiatric history. SOCIAL HISTORY:   Patient lives in an apartment with her fiancé. She has 4 other children who are not currently in her custody. 1 child is with the father, 1 with the grandparents, and 2 in foster care. She is not currently working    SUBSTANCE ABUSE HISTORY:  reports that she has been smoking cigarettes. She has been smoking an average of .25 packs per day. She has never used smokeless tobacco. She reports that she does not currently use alcohol. She reports current drug use. Drugs: Marijuana (Weed) and IV.     VITALS:   Vitals:    01/17/23 1148   BP: 120/71   Pulse: (!) 109   Resp:    Temp:      LABS: Admission on 01/16/2023   Component Date Value Ref Range Status    WBC 01/16/2023 9.4  4.5 - 11.5 E9/L Final    RBC 01/16/2023 3.87  3.50 - 5.50 E12/L Final    Hemoglobin 01/16/2023 11.9  11.5 - 15.5 g/dL Final    Hematocrit 01/16/2023 34.8  34.0 - 48.0 % Final    MCV 01/16/2023 89.9  80.0 - 99.9 fL Final    MCH 01/16/2023 30.7  26.0 - 35.0 pg Final    MCHC 01/16/2023 34.2  32.0 - 34.5 % Final    RDW 01/16/2023 13.4  11.5 - 15.0 fL Final    Platelets 45/68/0389 205  130 - 450 E9/L Final    MPV 01/16/2023 10.3  7.0 - 12.0 fL Final    Neutrophils % 01/16/2023 86.2 (A)  43.0 - 80.0 % Final    Immature Granulocytes % 01/16/2023 0.6  0.0 - 5.0 % Final    Lymphocytes % 01/16/2023 10.2 (A)  20.0 - 42.0 % Final    Monocytes % 01/16/2023 2.6  2.0 - 12.0 % Final    Eosinophils % 01/16/2023 0.2  0.0 - 6.0 % Final    Basophils % 01/16/2023 0.2  0.0 - 2.0 % Final    Neutrophils Absolute 01/16/2023 8. 10 (A)  1.80 - 7.30 E9/L Final    Immature Granulocytes # 01/16/2023 0.06  E9/L Final    Lymphocytes Absolute 01/16/2023 0.96 (A)  1.50 - 4.00 E9/L Final    Monocytes Absolute 01/16/2023 0.24  0.10 - 0.95 E9/L Final    Eosinophils Absolute 01/16/2023 0.02 (A)  0.05 - 0.50 E9/L Final    Basophils Absolute 01/16/2023 0.02  0.00 - 0.20 E9/L Final    Hep B S Ag Interp 01/16/2023 Non-Reactive  Non-Reactive Final    Amphetamine Screen, Urine 01/16/2023 NOT DETECTED  Negative <1000 ng/mL Final    Barbiturate Screen, Ur 01/16/2023 NOT DETECTED  Negative < 200 ng/mL Final    Benzodiazepine Screen, Urine 01/16/2023 NOT DETECTED  Negative < 200 ng/mL Final    Cannabinoid Scrn, Ur 01/16/2023 NOT DETECTED  Negative < 50ng/mL Final    Cocaine Metabolite Screen, Urine 01/16/2023 POSITIVE (A)  Negative < 300 ng/mL Final    Opiate Scrn, Ur 01/16/2023 NOT DETECTED  Negative < 300ng/mL Final    Note:  The Opiate Screen is not intended to detect Oxycodone.     PCP Screen, Urine 01/16/2023 NOT DETECTED  Negative < 25 ng/mL Final    Methadone Screen, Urine 01/16/2023 NOT DETECTED  Negative <300 ng/mL Final    Oxycodone Urine 01/16/2023 NOT DETECTED  Negative <100 ng/mL Final    FENTANYL SCREEN, URINE 01/16/2023 POSITIVE (A)  Negative <1 ng/mL Final    Comment: Note: Therapeutic levels of some antipsychotics  (e.g., Haloperidol, Risperidone), antidepressants  (e.g., Trazadone), Methamphetamine and the hypertension  drug Labetalol are known to show cross-reactivity and  cause false positive results. Therefore, confirmatory  testing for Fentanyl and metabolites should be  considered if clinically indicated. Drug Screen Comment: 01/16/2023 see below   Final    Comment: These drug screen results are for medical purposes only and  should not be considered definitive or confirmed. The drug  methodology concentration value must be greater than or equal  to the cutoff to be reported as positive. Confirmatory testing  orders and/or interpretive screening questions can be directed  to toxicology at [phone]. The absence of expected drug(s) and/or metabolite(s) may be due  to inappropriate timing of specimen collection relative to drug  administration, poor drug absorption, diluted/adulterated urine,  or limitations of screening testing methodology.       Hep C Ab Interp 01/16/2023 REACTIVE (A)  Non-Reactive Final    RPR 01/16/2023 NON-REACTIVE  Non-reactive Final    Amnisure, POC 01/16/2023 Negative  Negative Final    Lot Number 01/16/2023 [id number]   Final    Positive QC Pass/Fail 01/16/2023 Pass   Final    Negative QC Pass/Fail 01/16/2023 Pass   Final    Source 01/16/2023 Urine   Final    ABO/Rh 01/16/2023 A POS   Final    Antibody Screen 01/16/2023 NEG   Final    BENZOYLECGONINE, QUANTITATIVE, URI* 01/16/2023 >1,000.0  Cutoff 50 ng/mL Final    Comment 01/16/2023 see below   Final    Comment: TEST INFORMATION:  Quantitative Drug Detection, Urine    METHODOLOGY:  Liquid Chromatography/Mass Spectrometry    INTERPRETIVE INFORMATION:  The absence of expected drug(s) and /or metabolite(s) may indicate  non-compliance, inappropriate timing of specimen collection relative to drug  administration, poor drug absorption, diluted/adulterated urine, or  limitations of testing. Identification of specific drug(s) may be  problematic due to common metabolites, some of which are prescription drugs  themselves. The concentration value must be greater than or equal to the  cutoff to be reported as a quantitative result. Interpretive results should  be directed to the Toxicology department at [phone]. LABORATORY DEVELOPED TEST (LDT) DISCLAIMER:  This test was developed, and its performance characteristics have been  determined by [address] [place] [address] Laboratory. The  laboratory is regulated under CLIA as qualified to perform high-complexity  testing. One or m                           ore of these tests have not been cleared or approved by the  FDA. These results are not intended to be used as the only means for  clinical diagnosis or patient management decisions. Fentanyl, Urine, Quant 01/16/2023 422.3  Cutoff 5 ng/mL Final    Norfentanyl, Urine, Quant 01/16/2023 >1,000.0  Cutoff 10 ng/mL Final    Sodium 01/16/2023 134  132 - 146 mmol/L Final    Potassium 01/16/2023 3.5  3.5 - 5.0 mmol/L Final    Chloride 01/16/2023 103  98 - 107 mmol/L Final    CO2 01/16/2023 20 (A)  22 - 29 mmol/L Final    Anion Gap 01/16/2023 11  7 - 16 mmol/L Final    Glucose 01/16/2023 87  74 - 99 mg/dL Final    BUN 01/16/2023 6  6 - 20 mg/dL Final    Creatinine 01/16/2023 0.5  0.5 - 1.0 mg/dL Final    Est, Glom Filt Rate 01/16/2023 >60  >=60 mL/min/1.73 Final    Comment: Pediatric calculator link  [employer]ila[employer].at. org/professionals/kdoqi/gfr_calculatorped  Effective Oct 3, 2022  These results are not intended for use in patients  <25years of age. eGFR results are calculated without  a race factor using the 2021 CKD-EPI equation.   Careful  clinical correlation is recommended, particularly when  comparing to results calculated using previous equations. The CKD-EPI equation is less accurate in patients with  extremes of muscle mass, extra-renal metabolism of  creatinine, excessive creatinine ingestion, or following  therapy that affects renal tubular secretion. Calcium 01/16/2023 8.5 (A)  8.6 - 10.2 mg/dL Final    Total Protein 01/16/2023 5.9 (A)  6.4 - 8.3 g/dL Final    Albumin 01/16/2023 2.8 (A)  3.5 - 5.2 g/dL Final    Total Bilirubin 01/16/2023 0.4  0.0 - 1.2 mg/dL Final    Alkaline Phosphatase 01/16/2023 81  35 - 104 U/L Final    ALT 01/16/2023 17  0 - 32 U/L Final    AST 01/16/2023 17  0 - 31 U/L Final    Troponin, High Sensitivity 01/16/2023 <6  0 - 9 ng/L Final    Comment: High Sensitivity Troponin values cannot be compared with  other Troponin methodologies. Patients with high levels of Biotin oral intake (i.e. >5 mg/day)  may have falsely decreased Troponin levels. Samples collected  within 8 hours of biotin intake may require additional information  for diagnosis. Ventricular Rate 01/16/2023 90  BPM Final    Atrial Rate 01/16/2023 90  BPM Final    P-R Interval 01/16/2023 128  ms Final    QRS Duration 01/16/2023 70  ms Final    Q-T Interval 01/16/2023 348  ms Final    QTc Calculation (Bazett) 01/16/2023 425  ms Final    P Axis 01/16/2023 53  degrees Final    R Axis 01/16/2023 67  degrees Final    T Axis 01/16/2023 69  degrees Final    Magnesium 01/16/2023 2.0  1.6 - 2.6 mg/dL Final    Buprenorphine, Urine, Quantitative 01/16/2023 125.0  Cutoff 5 ng/mL Final    NORBUPRENORPHINE, QUANTITATIVE, UR* 01/16/2023 458. 1  Cutoff 10 ng/mL Final         MENTAL STATUS EXAMINATION  White female, appears age. Patient is superficially forthcoming with information. Psychomotor agitation, normal strength, tone. Gait not assessed. Mood \"I am in pain. \"  Affect depressed. Speech clear. Thought process organized. Thought content future oriented.   Denies suicidal ideation, intent, or plan. Denies hallucinations, delusions, paranoia. Orientation, concentration, recent, remote memory are grossly intact. Fund of knowledge fair. Insight and judgment poor. ASSESSMENT:  Opioid use disorder with withdrawal    PLAN & RECOMMENDATIONS: Support and reassurance provided to patient. Updated nursing. Will order buprenorphine 8 mg twice daily for opioid withdrawal.  Patient has already been ordered Klonopin 1 mg twice daily as needed, clonidine 0.1 mg 3 times daily as needed, and trazodone 50 mg nightly. Plan is for patient to be discharged back to [facility]. I spoke with nurse Mike Millard at the [facility] to ensure that patient will be able to receive buprenorphine while in custody, she confirmed that this medication will be ordered and administered. 7-day prescription of buprenorphine also printed and placed in soft chart in case patient gets released from correction. Patient also provided with [facility] addiction medicine phone number and advised to call for an appointment to continue treatment with buprenorphine. Okay to discharge patient from a psychiatric standpoint when medically stable. Psychiatry signs off. Thank you    NOTE:  This report was transcribed using voice recognition software. Every effort was made to ensure accuracy; however, inadvertent computerized transcription errors may be present.     Erika Horton, JACK - CNP 1/17/2023 11:59 AM

## 2023-01-17 NOTE — PROGRESS NOTES
A man called in looking for pt and wanted to bring her Zaira Croft" told him he was not allowed, police recommend moving rooms

## 2023-01-17 NOTE — PROGRESS NOTES
Pt restless cow score 17 pt unable to sit still.  Called dr Virginia Strickland and received orders for withdrawal.

## 2023-01-17 NOTE — PROGRESS NOTES
Patient ok to discharge per dr. Harley Gonzalez and dr. Venecia Jon. Medical at the [facility] senior care called and report given to RN. Discharge instructions and script for subtutex given to officer in an envelope. Patient wheeled out to awaiting transport via officer.

## 2023-01-17 NOTE — PROGRESS NOTES
This nurse at bedside.  Patient less agitated than last hour.  Appears that patient has had some yogurt and has been able to tolerate at this time.  Patient refuses continuous monitoring at this time.  Spot check fht 150.  Guard at bedside.

## 2023-01-17 NOTE — PROGRESS NOTES
This nurse at bedside to access patient, patient found sleeping, vitals obtained and cows score completed. Fetal heart rate obtained but patient refused to have continuous monitoring. Breakfast at bedside but patient educated that if she is nauseous then she should hold off on eating. Guard at bedside with room door open.

## 2023-01-17 NOTE — PROGRESS NOTES
Patient vomiting, restless and says she is having abdominal pain  Cervix is closed  She is not keeping monitors on  Dr. Wang See on call for medication orders, multiple medications have been given  IM was consulted and they ordered an EKG  MFM following patient  30.4 days by US done yesterday  VSS, 119/61, 98.3, 72

## 2023-01-18 LAB
C. TRACHOMATIS DNA ,URINE: NEGATIVE
N. GONORRHOEAE DNA, URINE: NEGATIVE
SOURCE: NORMAL

## 2023-01-22 LAB
Lab: NORMAL
REPORT: NORMAL
THIS TEST SENT TO: NORMAL

## 2023-03-13 ENCOUNTER — HOSPITAL ENCOUNTER (INPATIENT)
Age: 38
LOS: 2 days | Discharge: HOME | End: 2023-03-15
Attending: OBSTETRICS & GYNECOLOGY | Admitting: OBSTETRICS & GYNECOLOGY
Payer: COMMERCIAL

## 2023-03-13 DIAGNOSIS — F11.90 OPIOID USE DISORDER: Primary | ICD-10-CM

## 2023-03-13 PROBLEM — Z3A.38 PREGNANCY WITH 38 COMPLETED WEEKS GESTATION: Status: ACTIVE | Noted: 2023-03-13

## 2023-03-13 PROBLEM — Z34.93 NORMAL PREGNANCY, THIRD TRIMESTER: Status: ACTIVE | Noted: 2023-03-13

## 2023-03-13 LAB
ABO/RH: NORMAL
AMPHETAMINE SCREEN, URINE: NOT DETECTED
ANTIBODY SCREEN: NORMAL
BARBITURATE SCREEN URINE: NOT DETECTED
BASOPHILS ABSOLUTE: 0.01 E9/L (ref 0–0.2)
BASOPHILS RELATIVE PERCENT: 0.1 % (ref 0–2)
BENZODIAZEPINE SCREEN, URINE: NOT DETECTED
CANNABINOID SCREEN URINE: NOT DETECTED
COCAINE METABOLITE SCREEN URINE: POSITIVE
EOSINOPHILS ABSOLUTE: 0.02 E9/L (ref 0.05–0.5)
EOSINOPHILS RELATIVE PERCENT: 0.1 % (ref 0–6)
FENTANYL SCREEN, URINE: POSITIVE
HCT VFR BLD CALC: 31.8 % (ref 34–48)
HEMOGLOBIN: 10.6 G/DL (ref 11.5–15.5)
IMMATURE GRANULOCYTES #: 0.08 E9/L
IMMATURE GRANULOCYTES %: 0.6 % (ref 0–5)
LYMPHOCYTES ABSOLUTE: 0.79 E9/L (ref 1.5–4)
LYMPHOCYTES RELATIVE PERCENT: 5.8 % (ref 20–42)
Lab: ABNORMAL
MCH RBC QN AUTO: 29.1 PG (ref 26–35)
MCHC RBC AUTO-ENTMCNC: 33.3 % (ref 32–34.5)
MCV RBC AUTO: 87.4 FL (ref 80–99.9)
METHADONE SCREEN, URINE: NOT DETECTED
MONOCYTES ABSOLUTE: 0.51 E9/L (ref 0.1–0.95)
MONOCYTES RELATIVE PERCENT: 3.7 % (ref 2–12)
NEUTROPHILS ABSOLUTE: 12.23 E9/L (ref 1.8–7.3)
NEUTROPHILS RELATIVE PERCENT: 89.7 % (ref 43–80)
OPIATE SCREEN URINE: NOT DETECTED
OXYCODONE URINE: NOT DETECTED
PDW BLD-RTO: 13.8 FL (ref 11.5–15)
PHENCYCLIDINE SCREEN URINE: NOT DETECTED
PLATELET # BLD: 205 E9/L (ref 130–450)
PMV BLD AUTO: 11.9 FL (ref 7–12)
RAPID HIV 1&2: NORMAL
RBC # BLD: 3.64 E12/L (ref 3.5–5.5)
WBC # BLD: 13.6 E9/L (ref 4.5–11.5)

## 2023-03-13 PROCEDURE — G0480 DRUG TEST DEF 1-7 CLASSES: HCPCS

## 2023-03-13 PROCEDURE — 86850 RBC ANTIBODY SCREEN: CPT

## 2023-03-13 PROCEDURE — 85025 COMPLETE CBC W/AUTO DIFF WBC: CPT

## 2023-03-13 PROCEDURE — 1220000000 HC SEMI PRIVATE OB R&B

## 2023-03-13 PROCEDURE — 6370000000 HC RX 637 (ALT 250 FOR IP): Performed by: MIDWIFE

## 2023-03-13 PROCEDURE — 88307 TISSUE EXAM BY PATHOLOGIST: CPT

## 2023-03-13 PROCEDURE — 59409 OBSTETRICAL CARE: CPT | Performed by: MIDWIFE

## 2023-03-13 PROCEDURE — 86701 HIV-1ANTIBODY: CPT

## 2023-03-13 PROCEDURE — 36415 COLL VENOUS BLD VENIPUNCTURE: CPT

## 2023-03-13 PROCEDURE — 7200000001 HC VAGINAL DELIVERY

## 2023-03-13 PROCEDURE — 86900 BLOOD TYPING SEROLOGIC ABO: CPT

## 2023-03-13 PROCEDURE — 6360000002 HC RX W HCPCS

## 2023-03-13 PROCEDURE — 6360000002 HC RX W HCPCS: Performed by: MIDWIFE

## 2023-03-13 PROCEDURE — 86901 BLOOD TYPING SEROLOGIC RH(D): CPT

## 2023-03-13 PROCEDURE — 80307 DRUG TEST PRSMV CHEM ANLYZR: CPT

## 2023-03-13 RX ORDER — MISOPROSTOL 200 UG/1
800 TABLET ORAL PRN
Status: DISCONTINUED | OUTPATIENT
Start: 2023-03-13 | End: 2023-03-16 | Stop reason: HOSPADM

## 2023-03-13 RX ORDER — SODIUM CHLORIDE, SODIUM LACTATE, POTASSIUM CHLORIDE, AND CALCIUM CHLORIDE .6; .31; .03; .02 G/100ML; G/100ML; G/100ML; G/100ML
1000 INJECTION, SOLUTION INTRAVENOUS PRN
Status: DISCONTINUED | OUTPATIENT
Start: 2023-03-13 | End: 2023-03-13 | Stop reason: HOSPADM

## 2023-03-13 RX ORDER — SODIUM CHLORIDE, SODIUM LACTATE, POTASSIUM CHLORIDE, CALCIUM CHLORIDE 600; 310; 30; 20 MG/100ML; MG/100ML; MG/100ML; MG/100ML
INJECTION, SOLUTION INTRAVENOUS CONTINUOUS
Status: DISCONTINUED | OUTPATIENT
Start: 2023-03-13 | End: 2023-03-13 | Stop reason: SDUPTHER

## 2023-03-13 RX ORDER — SODIUM CHLORIDE 0.9 % (FLUSH) 0.9 %
5-40 SYRINGE (ML) INJECTION EVERY 12 HOURS SCHEDULED
Status: DISCONTINUED | OUTPATIENT
Start: 2023-03-13 | End: 2023-03-13 | Stop reason: SDUPTHER

## 2023-03-13 RX ORDER — CLONAZEPAM 0.5 MG/1
0.5 TABLET ORAL 2 TIMES DAILY PRN
COMMUNITY

## 2023-03-13 RX ORDER — SODIUM CHLORIDE 9 MG/ML
INJECTION, SOLUTION INTRAVENOUS PRN
Status: DISCONTINUED | OUTPATIENT
Start: 2023-03-13 | End: 2023-03-16 | Stop reason: HOSPADM

## 2023-03-13 RX ORDER — SODIUM CHLORIDE, SODIUM LACTATE, POTASSIUM CHLORIDE, AND CALCIUM CHLORIDE .6; .31; .03; .02 G/100ML; G/100ML; G/100ML; G/100ML
1000 INJECTION, SOLUTION INTRAVENOUS PRN
Status: DISCONTINUED | OUTPATIENT
Start: 2023-03-13 | End: 2023-03-16 | Stop reason: HOSPADM

## 2023-03-13 RX ORDER — SODIUM CHLORIDE 9 MG/ML
25 INJECTION, SOLUTION INTRAVENOUS PRN
Status: DISCONTINUED | OUTPATIENT
Start: 2023-03-13 | End: 2023-03-13 | Stop reason: SDUPTHER

## 2023-03-13 RX ORDER — SODIUM CHLORIDE 0.9 % (FLUSH) 0.9 %
5-40 SYRINGE (ML) INJECTION EVERY 12 HOURS SCHEDULED
Status: DISCONTINUED | OUTPATIENT
Start: 2023-03-13 | End: 2023-03-16 | Stop reason: HOSPADM

## 2023-03-13 RX ORDER — OXYTOCIN 10 [USP'U]/ML
10 INJECTION, SOLUTION INTRAMUSCULAR; INTRAVENOUS ONCE
Status: COMPLETED | OUTPATIENT
Start: 2023-03-13 | End: 2023-03-13

## 2023-03-13 RX ORDER — FERROUS SULFATE 325(65) MG
325 TABLET ORAL 2 TIMES DAILY WITH MEALS
Status: DISCONTINUED | OUTPATIENT
Start: 2023-03-13 | End: 2023-03-16 | Stop reason: HOSPADM

## 2023-03-13 RX ORDER — SODIUM CHLORIDE, SODIUM LACTATE, POTASSIUM CHLORIDE, CALCIUM CHLORIDE 600; 310; 30; 20 MG/100ML; MG/100ML; MG/100ML; MG/100ML
INJECTION, SOLUTION INTRAVENOUS CONTINUOUS
Status: DISCONTINUED | OUTPATIENT
Start: 2023-03-13 | End: 2023-03-16 | Stop reason: HOSPADM

## 2023-03-13 RX ORDER — SODIUM CHLORIDE 0.9 % (FLUSH) 0.9 %
5-40 SYRINGE (ML) INJECTION PRN
Status: DISCONTINUED | OUTPATIENT
Start: 2023-03-13 | End: 2023-03-13 | Stop reason: SDUPTHER

## 2023-03-13 RX ORDER — SODIUM CHLORIDE, SODIUM LACTATE, POTASSIUM CHLORIDE, AND CALCIUM CHLORIDE .6; .31; .03; .02 G/100ML; G/100ML; G/100ML; G/100ML
500 INJECTION, SOLUTION INTRAVENOUS PRN
Status: DISCONTINUED | OUTPATIENT
Start: 2023-03-13 | End: 2023-03-16 | Stop reason: HOSPADM

## 2023-03-13 RX ORDER — ONDANSETRON 2 MG/ML
4 INJECTION INTRAMUSCULAR; INTRAVENOUS EVERY 6 HOURS PRN
Status: DISCONTINUED | OUTPATIENT
Start: 2023-03-13 | End: 2023-03-14

## 2023-03-13 RX ORDER — DOCUSATE SODIUM 100 MG/1
100 CAPSULE, LIQUID FILLED ORAL 2 TIMES DAILY
Status: DISCONTINUED | OUTPATIENT
Start: 2023-03-13 | End: 2023-03-16 | Stop reason: HOSPADM

## 2023-03-13 RX ORDER — METHYLERGONOVINE MALEATE 0.2 MG/ML
200 INJECTION INTRAVENOUS PRN
Status: DISCONTINUED | OUTPATIENT
Start: 2023-03-13 | End: 2023-03-16 | Stop reason: HOSPADM

## 2023-03-13 RX ORDER — CARBOPROST TROMETHAMINE 250 UG/ML
250 INJECTION, SOLUTION INTRAMUSCULAR PRN
Status: DISCONTINUED | OUTPATIENT
Start: 2023-03-13 | End: 2023-03-16 | Stop reason: HOSPADM

## 2023-03-13 RX ORDER — LIDOCAINE HYDROCHLORIDE 10 MG/ML
INJECTION, SOLUTION INFILTRATION; PERINEURAL
Status: DISPENSED
Start: 2023-03-13 | End: 2023-03-14

## 2023-03-13 RX ORDER — OXYTOCIN 10 [USP'U]/ML
INJECTION, SOLUTION INTRAMUSCULAR; INTRAVENOUS
Status: COMPLETED
Start: 2023-03-13 | End: 2023-03-13

## 2023-03-13 RX ORDER — ACETAMINOPHEN 650 MG
TABLET, EXTENDED RELEASE ORAL
Status: DISPENSED
Start: 2023-03-13 | End: 2023-03-14

## 2023-03-13 RX ORDER — IBUPROFEN 800 MG/1
800 TABLET ORAL EVERY 6 HOURS PRN
Status: DISCONTINUED | OUTPATIENT
Start: 2023-03-13 | End: 2023-03-14

## 2023-03-13 RX ORDER — LIDOCAINE HYDROCHLORIDE 10 MG/ML
INJECTION, SOLUTION EPIDURAL; INFILTRATION; INTRACAUDAL; PERINEURAL
Status: DISPENSED
Start: 2023-03-13 | End: 2023-03-14

## 2023-03-13 RX ORDER — SODIUM CHLORIDE, SODIUM LACTATE, POTASSIUM CHLORIDE, AND CALCIUM CHLORIDE .6; .31; .03; .02 G/100ML; G/100ML; G/100ML; G/100ML
500 INJECTION, SOLUTION INTRAVENOUS PRN
Status: DISCONTINUED | OUTPATIENT
Start: 2023-03-13 | End: 2023-03-13 | Stop reason: HOSPADM

## 2023-03-13 RX ORDER — SODIUM CHLORIDE 0.9 % (FLUSH) 0.9 %
5-40 SYRINGE (ML) INJECTION PRN
Status: DISCONTINUED | OUTPATIENT
Start: 2023-03-13 | End: 2023-03-16 | Stop reason: HOSPADM

## 2023-03-13 RX ORDER — DOCUSATE SODIUM 100 MG/1
100 CAPSULE, LIQUID FILLED ORAL 2 TIMES DAILY
Status: DISCONTINUED | OUTPATIENT
Start: 2023-03-13 | End: 2023-03-13 | Stop reason: SDUPTHER

## 2023-03-13 RX ORDER — MODIFIED LANOLIN
OINTMENT (GRAM) TOPICAL PRN
Status: DISCONTINUED | OUTPATIENT
Start: 2023-03-13 | End: 2023-03-16 | Stop reason: HOSPADM

## 2023-03-13 RX ADMIN — IBUPROFEN 800 MG: 800 TABLET, FILM COATED ORAL at 21:35

## 2023-03-13 RX ADMIN — OXYTOCIN 10 UNITS: 10 INJECTION, SOLUTION INTRAMUSCULAR; INTRAVENOUS at 20:00

## 2023-03-13 RX ADMIN — OXYTOCIN 10 UNITS: 10 INJECTION INTRAVENOUS at 20:00

## 2023-03-13 RX ADMIN — Medication 87.3 MILLI-UNITS/MIN: at 20:30

## 2023-03-13 ASSESSMENT — PAIN SCALES - GENERAL: PAINLEVEL_OUTOF10: 9

## 2023-03-13 ASSESSMENT — PAIN DESCRIPTION - LOCATION: LOCATION: ABDOMEN;VAGINA

## 2023-03-14 PROBLEM — R76.8 HEPATITIS C ANTIBODY TEST POSITIVE: Status: ACTIVE | Noted: 2023-03-14

## 2023-03-14 PROBLEM — F11.90 OPIOID USE DISORDER: Status: ACTIVE | Noted: 2023-03-14

## 2023-03-14 LAB
HCT VFR BLD CALC: 31.1 % (ref 34–48)
HEMOGLOBIN: 10 G/DL (ref 11.5–15.5)

## 2023-03-14 PROCEDURE — 85014 HEMATOCRIT: CPT

## 2023-03-14 PROCEDURE — 6370000000 HC RX 637 (ALT 250 FOR IP): Performed by: ADVANCED PRACTICE MIDWIFE

## 2023-03-14 PROCEDURE — 6360000002 HC RX W HCPCS: Performed by: ADVANCED PRACTICE MIDWIFE

## 2023-03-14 PROCEDURE — 99024 POSTOP FOLLOW-UP VISIT: CPT | Performed by: ADVANCED PRACTICE MIDWIFE

## 2023-03-14 PROCEDURE — 6370000000 HC RX 637 (ALT 250 FOR IP)

## 2023-03-14 PROCEDURE — 6370000000 HC RX 637 (ALT 250 FOR IP): Performed by: MIDWIFE

## 2023-03-14 PROCEDURE — 1220000000 HC SEMI PRIVATE OB R&B

## 2023-03-14 PROCEDURE — 85018 HEMOGLOBIN: CPT

## 2023-03-14 PROCEDURE — 36415 COLL VENOUS BLD VENIPUNCTURE: CPT

## 2023-03-14 RX ORDER — SIMETHICONE 80 MG
80 TABLET,CHEWABLE ORAL EVERY 6 HOURS PRN
Status: DISCONTINUED | OUTPATIENT
Start: 2023-03-14 | End: 2023-03-16 | Stop reason: HOSPADM

## 2023-03-14 RX ORDER — KETOROLAC TROMETHAMINE 30 MG/ML
30 INJECTION, SOLUTION INTRAMUSCULAR; INTRAVENOUS EVERY 6 HOURS PRN
Status: DISCONTINUED | OUTPATIENT
Start: 2023-03-14 | End: 2023-03-16 | Stop reason: HOSPADM

## 2023-03-14 RX ORDER — ONDANSETRON 4 MG/1
4 TABLET, ORALLY DISINTEGRATING ORAL EVERY 8 HOURS PRN
Status: DISCONTINUED | OUTPATIENT
Start: 2023-03-14 | End: 2023-03-16 | Stop reason: HOSPADM

## 2023-03-14 RX ORDER — KETOROLAC TROMETHAMINE 30 MG/ML
30 INJECTION, SOLUTION INTRAMUSCULAR; INTRAVENOUS EVERY 6 HOURS PRN
Status: DISCONTINUED | OUTPATIENT
Start: 2023-03-14 | End: 2023-03-14

## 2023-03-14 RX ORDER — IBUPROFEN 800 MG/1
800 TABLET ORAL EVERY 8 HOURS PRN
Status: DISCONTINUED | OUTPATIENT
Start: 2023-03-14 | End: 2023-03-16 | Stop reason: HOSPADM

## 2023-03-14 RX ORDER — BUPRENORPHINE 2 MG/1
4 TABLET SUBLINGUAL PRN
Status: DISCONTINUED | OUTPATIENT
Start: 2023-03-14 | End: 2023-03-15

## 2023-03-14 RX ORDER — CLONIDINE HYDROCHLORIDE 0.1 MG/1
0.1 TABLET ORAL 3 TIMES DAILY PRN
Status: DISCONTINUED | OUTPATIENT
Start: 2023-03-14 | End: 2023-03-15

## 2023-03-14 RX ADMIN — ONDANSETRON 4 MG: 4 TABLET, ORALLY DISINTEGRATING ORAL at 16:56

## 2023-03-14 RX ADMIN — KETOROLAC TROMETHAMINE 30 MG: 30 INJECTION, SOLUTION INTRAMUSCULAR; INTRAVENOUS at 16:04

## 2023-03-14 RX ADMIN — IBUPROFEN 800 MG: 800 TABLET, FILM COATED ORAL at 03:46

## 2023-03-14 RX ADMIN — DOCUSATE SODIUM 100 MG: 100 CAPSULE, LIQUID FILLED ORAL at 08:23

## 2023-03-14 RX ADMIN — SIMETHICONE 80 MG: 80 TABLET, CHEWABLE ORAL at 16:04

## 2023-03-14 RX ADMIN — SIMETHICONE 80 MG: 80 TABLET, CHEWABLE ORAL at 08:23

## 2023-03-14 RX ADMIN — KETOROLAC TROMETHAMINE 30 MG: 30 INJECTION, SOLUTION INTRAMUSCULAR; INTRAVENOUS at 23:21

## 2023-03-14 RX ADMIN — CLONIDINE HYDROCHLORIDE 0.1 MG: 0.1 TABLET ORAL at 16:04

## 2023-03-14 RX ADMIN — KETOROLAC TROMETHAMINE 30 MG: 30 INJECTION, SOLUTION INTRAMUSCULAR; INTRAVENOUS at 10:13

## 2023-03-14 ASSESSMENT — PAIN DESCRIPTION - DESCRIPTORS
DESCRIPTORS: DISCOMFORT
DESCRIPTORS: CRAMPING;DISCOMFORT;ACHING

## 2023-03-14 ASSESSMENT — PAIN SCALES - GENERAL
PAINLEVEL_OUTOF10: 8
PAINLEVEL_OUTOF10: 10

## 2023-03-14 ASSESSMENT — PAIN DESCRIPTION - ORIENTATION
ORIENTATION: RIGHT
ORIENTATION: LOWER
ORIENTATION: RIGHT

## 2023-03-14 ASSESSMENT — PAIN - FUNCTIONAL ASSESSMENT: PAIN_FUNCTIONAL_ASSESSMENT: ACTIVITIES ARE NOT PREVENTED

## 2023-03-14 ASSESSMENT — PAIN DESCRIPTION - LOCATION
LOCATION: ABDOMEN

## 2023-03-14 NOTE — PROGRESS NOTES
Department of Obstetrics and Gynecology  Labor and Delivery  Midwife  Post Partum Progress Note    Postpartum Day # 1    SUBJECTIVE:  The patient feels poorly. The patient has emotional concerns. She has a history of PP depression and feels \"white xanax\" worked really well fr her. She also like clonopin for her withdrawal symptoms, which she is wondering if she has. She states wasn't on her subutex because she couldn't find a doctor, She states she was only using twice a day to avoid getting \"sick\". She is complaining of Right lower quadrant pain since prior to delivery. \"I feel like I may have pulled something, its really bad\". She was able to lie straight back without discomfort. She complained of tender ness to palpation in RLQ as well as LLQ and generally everywhere across entire abdomen but not as intensly. Pain is poorly controlled with current medications. (Motrin only as is allergic to tylenol). Denies Dysuria urgency frequency. States passing gas 'a little\". No N/V, ordering a hamburger with pickles from dietary as I walked in. The baby iswell. Baby is feeding via bottle. She has baby in bed with her and holds baby but is not looking at baby. Urinary output is adequate. The patient is ambulating well. The patient is tolerating a normal diet. Flatus has been passed. OBJECTIVE:      Vitals:  /68   Pulse 82   Temp 98.4 °F (36.9 °C) (Oral)   Resp 18   Ht 5' 5\" (1.651 m)   Wt 168 lb (76.2 kg)   LMP  (LMP Unknown)   SpO2 97%   Breastfeeding Unknown   BMI 27.96 kg/m²         Patient Vitals for the past 8 hrs:   BP Temp Temp src Pulse Resp SpO2   03/14/23 0713 133/68 98.4 °F (36.9 °C) Oral 82 18 97 %   03/14/23 0510 131/65 98.3 °F (36.8 °C) Oral 90 16 98 %     General:    alert, appears stated age, and cooperative   Bowel Sounds:  active   Lochia:  appropriate   Uterine Fundus:   firm   Incision:     DVT Evaluation:  No cords or calf tenderness. No significant calf/ankle edema. SKIN: many round and irregular lesions in various states of healing all along legs, thighs, forearms. Pregnancy complicated by:   Patient Active Problem List   Diagnosis Code    No prenatal care in current pregnancy in third trimester O09.33    Withdrawal from opioids ([address] F11.93    Positive pregnancy test Z32.01    Positive urine drug screen R82.5    Normal pregnancy, third trimester Z34.93    Pregnancy with 45 completed weeks gestation Z3A.38    Normal delivery O80     PAST OB HISTORY  OB History          5    Para   5    Term   2       1    AB        Living   5         SAB        IAB        Ectopic        Molar        Multiple   0    Live Births   4                Past Medical History:        Diagnosis Date    Asthma     childhood sports induced    Bipolar affective disorder, current episode depressed ([address]     Drug abuse ([address]     Mitral valve prolapse     PTSD (post-traumatic stress disorder)      Specifically no history of asthma. Past Surgical History:        Procedure Laterality Date    ELBOW SURGERY Left     childhood     Specifically no history of anesthesia reactions or problems. No history of bleeding or trouble healing / recovering from surgery. Social History:    TOBACCO:   reports that she has been smoking cigarettes. She has been smoking an average of .25 packs per day. She has never used smokeless tobacco.  ETOH:   reports that she does not currently use alcohol. DRUGS:   reports current drug use. Drugs: Marijuana (Weed) and IV. Family History:       Problem Relation Age of Onset    Breast Cancer Paternal Grandmother     Breast Cancer Maternal Grandmother      Specifically no family history of bleeding problems or anesthesia reactions.      ASSESSMENT  Pregnancy complicated by:   Patient Active Problem List   Diagnosis Code    No prenatal care in current pregnancy in third trimester O09.33    Withdrawal from opioids ([address] F11.93    Positive pregnancy test Z32.01 Positive urine drug screen R82.5    Normal pregnancy, third trimester Z34.93    Pregnancy with 38 completed weeks gestation Z3A.38    Normal delivery O80     VSS  RLQ pain with reassuring exam  Hx Drug Use C/O withdrawal syx  Hx PP depression and Bipolar disorder  Amenable to Psych Consult. PLAN:   Psych consult ordered. Mylicon. I will consult with Dr Louie Escobar re: further pain management.

## 2023-03-14 NOTE — CARE COORDINATION
SW met with pt to discuss [employer]. SW briefly discussed the process of the program and services provided (medicated assisted treatment, psychiatry services, individual/group therapy, case management, etc.) Pt appeared receptive of the information and expressed interest in attending the program upon discharge from inpatient tx. This SW to continue to follow up with [facility] to receive updates on pt. SW will provide assistance when needed.      Electronically signed by FREDERICK Cruz, DUNIA on 3/14/2023 at 5:36 PM

## 2023-03-14 NOTE — PROGRESS NOTES
Delivery of viable baby girl at 26. NICU present for delivery. Baby dried and stimulated, taken to warmer. APGARs 8/9 via NICU. Baby to be followed by NICU DrsMonica Due to hx of fentanyl use. Baby to normal  nursery following recovery.

## 2023-03-14 NOTE — CARE COORDINATION
[phone]  ,Carolin Quinones, presented to the hospital to meet with [facility]. Adelita reported that she will be talking with her supervisor prior to providing disposition for baby. SW was also able to meet with RENNY James, KAVON from [facility], and peer supporter Nina Queen to discuss patient's case and provide patient with additional support. [facility] expressed appreciation and voiced interest in addiction treatment, specifically inpatient at [facility], and the [employer] after. PLAN    Baby can NOT be discharged home until [facility] ( [phone]) provides disposition  SW to continue communication with nursing staff and [facility] ( [phone])    SW to continue to follow for support and treatment referrals.        Electronically signed by DUNIA Reeves on 3/14/2023 at 3:19 PM

## 2023-03-14 NOTE — H&P
Department of Obstetrics and Gynecology  Nurse Practitioner Obstetrics History and Physical        CHIEF COMPLAINT:  contractions    HISTORY OF PRESENT ILLNESS:  Corona Dsouza is a 30 y.o. female [id number], No LMP recorded (lmp unknown). Patient is pregnant. ,  at 38w2d. Presents to L&D with contractions that started about an hour ago. Also leaking of yellow fluid for a few hours. Denies bleeding. Limited prenatal care, patient is active IVDA and was in senior care for much of her pregnancy. She admits to daily heroin use.  + Hep C. Dating by 3rd trimester US at [facility]. 4 previous vaginal births    OB History          5    Para   4    Term   1       1    AB        Living   4         SAB        IAB        Ectopic        Molar        Multiple   0    Live Births   3                Estimated Due Date: Estimated Date of Delivery: 3/25/23      Pregnancy complicated by:   Patient Active Problem List   Diagnosis Code    No prenatal care in current pregnancy in third trimester O09.33    Withdrawal from opioids ([facility] [address] F11.93    Positive pregnancy test Z32.01    Positive urine drug screen R82.5    Normal pregnancy, third trimester Z34.93    Pregnancy with 45 completed weeks gestation Z3A.38           PAST OB HISTORY  OB History          5    Para   4    Term   1       1    AB        Living   4         SAB        IAB        Ectopic        Molar        Multiple   0    Live Births   3                  Past Medical History:          Diagnosis Date    Asthma     childhood sports induced    Bipolar affective disorder, current episode depressed ([address]     Drug abuse ([address]     Mitral valve prolapse     PTSD (post-traumatic stress disorder)        Past Surgical History:          Procedure Laterality Date    ELBOW SURGERY Left     childhood       Social History:    TOBACCO:   reports that she has been smoking cigarettes. She has been smoking an average of .25 packs per day.  She has never used smokeless tobacco.  ETOH:   reports that she does not currently use alcohol. DRUGS:   reports current drug use. Drugs: Marijuana (Weed) and IV. Family History:       Problem Relation Age of Onset    Breast Cancer Paternal Grandmother     Breast Cancer Maternal Grandmother        Medications Prior to Admission:  Medications Prior to Admission: ibuprofen (ADVIL;MOTRIN) 800 MG tablet, Take 1 tablet by mouth every 8 hours as needed for Pain (Patient not taking: Reported on 1/16/2023)  Prenatal Vit-Fe Fumarate-FA (PRENATAL PLUS/IRON) 27-1 MG TABS tablet, Take 1 tablet by mouth daily (Patient not taking: Reported on 1/16/2023)  ondansetron (ZOFRAN ODT) 4 MG disintegrating tablet, Take 1 tablet by mouth every 8 hours as needed for Nausea or Vomiting (Patient not taking: Reported on 1/16/2023)    Allergies:  Naloxone and Tylenol [acetaminophen]      REVIEW OF SYSTEMS:          CONSTITUTIONAL :      No fever, no chills   HEENT :                         Headache absent,   visual disturbances absent  CARDIOVASCULAR :    No chest pain, no palpitations, no edema   RESPIRATORY :            No pain, no shortness of breath   GASTROINTESTINAL : No N/V, no D/C,    abdominal pain absent   GENITOURINARY :      Dysuria   absent,   hematuria absent,   urinary frequency absent  MUSCULOSKELETAL:  No myalgia,   back pain absent  INTEGUMENTARY:  Warm, dry, no rashes  NEUROLOGICAL :        No migraine, no seizures. Pertinent positives and negatives addressed in HPI, other systems reviewed and negative      PHYSICAL EXAM:    LMP  (LMP Unknown)     General appearance:  awake, alert, cooperative, no apparent distress, and appears stated age  Neurologic:  Awake, alert, oriented to name, place and time.   Ambulatory to unit      Lungs:  Respirations easy and unlabored    Abdomen:   soft, gravid, non-tender,   Fetal position vertex by Leopold's   EFW AGA  :  CVA tenderness NA     Fetal heart rate:  Baseline Heart Rate appears to be 120's, unable to trace well due to maternal movement     Pelvis:  External Genitalia: Lesions absent  SSE:  NA  Cervix:    DILATION:  Complete  EFFACEMENT:  100%  STATION:  +2      Contractions:  regular, every 2   minutes  Membranes:  today at unknown time, yellow in color      GBS: unknown      Impression:  30 y.o. [id number] 38w2d active labor, GBS unknown        Plan:  delivery imminent, NICU called to stand by        Electronically signed by JACK Faria CNM on 3/13/2023 at 8:16 PM

## 2023-03-14 NOTE — CONSULTS
PSYCHIATRY ATTENDING CONSULT    REASON FOR CONSULT: Requesting medication assisted opiate withdrawal therapy, requesting evaluation and treatment for history of postpartum depression, also history of bipolar disorder    REQUESTING PHYSICIAN:  Kamryn Zhang    CHIEF COMPLAINT: \"I am okay. I am in a little bit of pain. \"    HISTORY OF PRESENT ILLNESS:    Mario Grijalva  is a 30 y.o. female who was admitted on 3/13/2023 for active labor. Baby girl was delivered on 3/13/2023 at 1956. Patient had limited prenatal care and has been actively using heroin, fentanyl, and cocaine twice daily. She was also incarcerated during some of her pregnancy. Psychiatry is consulted as patient is requesting medication assisted opiate withdrawal therapy, requesting evaluation and treatment for history of postpartum depression, also history of bipolar disorder. Chart reviewed. Urine toxicology positive for cocaine and fentanyl. Home psychotropics of Klonopin 0.5 mg twice daily as needed. However, OARRS reviewed, no prescriptions noted. Patient had a previous hospital admission on 1/16/2023 when she presented from [facility], where she was incarcerated for drug possession, with complaints of back pain and opioid withdrawal.  She was started on buprenorphine 8 mg twice daily and discharged back to [facility]. Upon assessment today, patient is pleasant and cooperative with the baby in room.  Leyla and Ember Dee present at the bedside during assessment. Patient is alert and oriented to person, place, time, and situation. She is forthcoming with information. She states that when she was released from [facility], she was not sent with any prescription for Subutex. She began experiencing withdrawal symptoms and ultimately started using again. She did not have a provider to follow up with in the community.   She states she has been using $20 worth of heroin in the morning and night \"just to get by.\"  She states that her last use was yesterday morning. Patient reports feeling \"hot and cold. \"  She is slightly anxious as children services are involved with case and she is unsure what the outcome will be. However, no other withdrawal symptoms at this time. She has 4 other children who are not currently in her custody. Patient states that she is very interested in treatment for her opioid use disorder. Resources were provided to her by social work. Patient is not suicidal, homicidal, psychotic, or manic. No indication for inpatient psychiatric admission. COWS:   Resting Pulse Rate: beats/minute   Pulse rate  = 1       Sweating   Subjective report of chills or flushing = 1    Restlessness (observation during assessment)  Able to sit still = 0    Pupil size  Pupils pinned or normal size for room light = 0    Bone or Joint aches (if patient was having pain previously, only the additional component attributed to opiate withdrawal is scored)  Not present = 0    Runny nose or tearing   Not present = 0     GI Upset (over last 1/2 hour)  No GI symptoms = 0    Tremor (observation of outstretched hands)  No tremor = 0    Yawning (observation during assessment)  No yawning = 0    Anxiety or Irritability  Patient reports increasing irritability or anxiousness = 1    Gooseflesh skin  Skin is smooth = 0    Total score = 3  5 - 12 = mild withdrawal  13 - 24 = moderate withdrawal  25 - 36 = moderately severe withdrawal   > 36 = severe withdrawal     PAST PSYCHIATRIC HISTORY:    She reports seeing a psychiatrist in the past at [facility] and being diagnosed with bipolar disorder and PTSD. She has not been following with psychiatric treatment \"in a while. \"  Denies history of suicide attempts. Denies history of inpatient psychiatric hospitalizations.     PAST MEDICAL HISTORY:       Diagnosis Date    Asthma     childhood sports induced    Bipolar affective disorder, current episode depressed ([facility] [address]     Drug abuse ([facility] [address] Hepatitis C     Mitral valve prolapse     PTSD (post-traumatic stress disorder)      MEDICAL ROS: General - negative, neurological - negative, ENT - negative, CV - negative, pulmonary - negative, GI - negative, dermatologic - negative, rheumatologic - negative, musculoskeletal - negative,  - negative, hematologic - negative    PAST SURGICAL HISTORY:       Procedure Laterality Date    ELBOW SURGERY Left     childhood     MEDICATIONS: Current Facility-Administered Medications: simethicone (MYLICON) chewable tablet 80 mg, 80 mg, Oral, Q6H PRN  ibuprofen (ADVIL;MOTRIN) tablet 800 mg, 800 mg, Oral, Q8H PRN  ketorolac (TORADOL) injection 30 mg, 30 mg, IntraMUSCular, Q6H PRN  buprenorphine (SUBUTEX) SL tablet 4 mg, 4 mg, SubLINGual, PRN  cloNIDine (CATAPRES) tablet 0.1 mg, 0.1 mg, Oral, TID PRN  lactated ringers bolus, 500 mL, IntraVENous, PRN **OR** lactated ringers bolus, 1,000 mL, IntraVENous, PRN  methylergonovine (METHERGINE) injection 200 mcg, 200 mcg, IntraMUSCular, PRN  carboprost (HEMABATE) injection 250 mcg, 250 mcg, IntraMUSCular, PRN  miSOPROStol (CYTOTEC) tablet 800 mcg, 800 mcg, Rectal, PRN  tranexamic acid (CYKLOKAPRON) 1,000 mg in sodium chloride 0.9 % 100 mL IVPB, 1,000 mg, IntraVENous, Once PRN  oxytocin (PITOCIN) 15 units in 250 mL infusion, 87.3 vivian-units/min, IntraVENous, Continuous PRN **AND** oxytocin (PITOCIN) 10 unit bolus from the bag, 10 Units, IntraVENous, PRN  ondansetron (ZOFRAN) injection 4 mg, 4 mg, IntraVENous, Q6H PRN  lactated ringers IV soln infusion, , IntraVENous, Continuous  sodium chloride flush 0.9 % injection 5-40 mL, 5-40 mL, IntraVENous, 2 times per day  sodium chloride flush 0.9 % injection 5-40 mL, 5-40 mL, IntraVENous, PRN  0.9 % sodium chloride infusion, , IntraVENous, PRN  docusate sodium (COLACE) capsule 100 mg, 100 mg, Oral, BID  lansinoh lanolin ointment, , Topical, PRN  witch hazel-glycerin (TUCKS) pad, , Topical, PRN  benzocaine-menthol (DERMOPLAST) 20-0.5 % spray, , Topical, PRN  ferrous sulfate (IRON 325) tablet 325 mg, 325 mg, Oral, BID WC  measles, mumps & rubella vaccine (MMR) injection 0.5 mL, 0.5 mL, SubCUTAneous, Prior to discharge  tetanus-diphth-acell pertussis (BOOSTRIX) injection 0.5 mL, 0.5 mL, IntraMUSCular, Prior to discharge    ALLERGIES:  Naloxone and Tylenol [acetaminophen]    FAMILY PSYCHIATRIC HISTORY:   Alcohol use disorder in father. SOCIAL HISTORY:   Patient lives in an apartment with her fiancé. She has 4 other children who are not currently in her custody. 1 child is with the father, 1 with the grandparents, and 2 in foster care. She is not currently working. She reports a good support system. SUBSTANCE ABUSE HISTORY:  reports that she has been smoking cigarettes. She has been smoking an average of .25 packs per day. She has never used smokeless tobacco. She reports that she does not currently use alcohol. She reports current drug use. Drugs: Marijuana (Weed) and IV.     VITALS:   Vitals:    03/14/23 0713   BP: 133/68   Pulse: 82   Resp: 18   Temp: 98.4 °F (36.9 °C)   SpO2: 97%     LABS:   Admission on 03/13/2023   Component Date Value Ref Range Status    WBC 03/13/2023 13.6 (A)  4.5 - 11.5 E9/L Final    RBC 03/13/2023 3.64  3.50 - 5.50 E12/L Final    Hemoglobin 03/13/2023 10.6 (A)  11.5 - 15.5 g/dL Final    Hematocrit 03/13/2023 31.8 (A)  34.0 - 48.0 % Final    MCV 03/13/2023 87.4  80.0 - 99.9 fL Final    MCH 03/13/2023 29.1  26.0 - 35.0 pg Final    MCHC 03/13/2023 33.3  32.0 - 34.5 % Final    RDW 03/13/2023 13.8  11.5 - 15.0 fL Final    Platelets 96/07/2255 205  130 - 450 E9/L Final    MPV 03/13/2023 11.9  7.0 - 12.0 fL Final    Neutrophils % 03/13/2023 89.7 (A)  43.0 - 80.0 % Final    Immature Granulocytes % 03/13/2023 0.6  0.0 - 5.0 % Final    Lymphocytes % 03/13/2023 5.8 (A)  20.0 - 42.0 % Final    Monocytes % 03/13/2023 3.7  2.0 - 12.0 % Final    Eosinophils % 03/13/2023 0.1  0.0 - 6.0 % Final    Basophils % 03/13/2023 0.1  0.0 - 2.0 % Final    Neutrophils Absolute 03/13/2023 12.23 (A)  1.80 - 7.30 E9/L Final    Immature Granulocytes # 03/13/2023 0.08  E9/L Final    Lymphocytes Absolute 03/13/2023 0.79 (A)  1.50 - 4.00 E9/L Final    Monocytes Absolute 03/13/2023 0.51  0.10 - 0.95 E9/L Final    Eosinophils Absolute 03/13/2023 0.02 (A)  0.05 - 0.50 E9/L Final    Basophils Absolute 03/13/2023 0.01  0.00 - 0.20 E9/L Final    ABO/Rh 03/13/2023 A POS   Final    Antibody Screen 03/13/2023 NEG   Final    Amphetamine Screen, Urine 03/13/2023 NOT DETECTED  Negative <1000 ng/mL Final    Barbiturate Screen, Ur 03/13/2023 NOT DETECTED  Negative < 200 ng/mL Final    Benzodiazepine Screen, Urine 03/13/2023 NOT DETECTED  Negative < 200 ng/mL Final    Cannabinoid Scrn, Ur 03/13/2023 NOT DETECTED  Negative < 50ng/mL Final    Cocaine Metabolite Screen, Urine 03/13/2023 POSITIVE (A)  Negative < 300 ng/mL Final    Opiate Scrn, Ur 03/13/2023 NOT DETECTED  Negative < 300ng/mL Final    Note:  The Opiate Screen is not intended to detect Oxycodone. PCP Screen, Urine 03/13/2023 NOT DETECTED  Negative < 25 ng/mL Final    Methadone Screen, Urine 03/13/2023 NOT DETECTED  Negative <300 ng/mL Final    Oxycodone Urine 03/13/2023 NOT DETECTED  Negative <100 ng/mL Final    FENTANYL SCREEN, URINE 03/13/2023 POSITIVE (A)  Negative <1 ng/mL Final    Comment: Note: Therapeutic levels of some antipsychotics  (e.g., Haloperidol, Risperidone), antidepressants  (e.g., Trazadone), Methamphetamine and the hypertension  drug Labetalol are known to show cross-reactivity and  cause false positive results. Therefore, confirmatory  testing for Fentanyl and metabolites should be  considered if clinically indicated. Drug Screen Comment: 03/13/2023 see below   Final    Comment: These drug screen results are for medical purposes only and  should not be considered definitive or confirmed.  The drug  methodology concentration value must be greater than or equal  to the cutoff to be reported as positive. Confirmatory testing  orders and/or interpretive screening questions can be directed  to toxicology at [phone]. The absence of expected drug(s) and/or metabolite(s) may be due  to inappropriate timing of specimen collection relative to drug  administration, poor drug absorption, diluted/adulterated urine,  or limitations of screening testing methodology. Rapid HIV 1&2 03/13/2023 see below  Non-reactive Final    Non-reactive for HIV-1 and HIV-2 antibodies    Hemoglobin 03/14/2023 10.0 (A)  11.5 - 15.5 g/dL Final    Hematocrit 03/14/2023 31.1 (A)  34.0 - 48.0 % Final         MENTAL STATUS EXAMINATION  53-year-old  female, appears older than age. She is pleasant and cooperative, superficially forthcoming with information. Normal psychomotor activity. Mood \"I am okay. I am in a little bit of pain. \"  Affect flexible. Speech clear, normal rate and rhythm. Thought process organized. Thought content future oriented. Patient denies suicidal or homicidal ideation, intent, or plan. Denies hallucinations, delusions, paranoia. Orientation, concentration, recent, remote memory are grossly intact. Fund of knowledge fair. Insight fair. Judgment poor. ASSESSMENT:  Opioid use disorder    PLAN & RECOMMENDATIONS: Support and reassurance provided to patient. Patient is interested in treatment for her opioid use disorder. Patient is not currently experiencing withdrawal symptoms. For now, will order COWS assessment with PRN buprenorphine. Will also order Clonidine 0.1 mg TID PRN. Patient educated and agreeable with this. Patient spoke with peer support, Violet Hoover and [address] who provided patient with resources for treatment. Patient currently awaiting disposition from [facility] before making a decision on which program she will choose. She is appreciative of the support provided to her. I will follow up with patient tomorrow.  Update provided to bedside nurse. Thank you.     NOTE:  This report was transcribed using voice recognition software.  Every effort was made to ensure accuracy; however, inadvertent computerized transcription errors may be present.    JACK Fernandez - CNP 3/14/2023 2:58 PM

## 2023-03-14 NOTE — L&D DELIVERY NOTE
liveborn female, compound presentation with right hand, vigorous at delivery,  NICU present due to limited prenatal care.  Cord clamped and cut at 30 seconds and baby handed to waiting team.  Cord blood and cord gasses obtained and sent.  Placent aspontaneous, complete, 3V cord.  Fundus massaged to firm, no active bleeding, EBL ~250 cc, IM pitocin given in 4th stage as patient has no IV access.  Perineum inspected, intact.  Dr. Woods notified of delivery    Mother's Information      Labor Events      Cervical Ripening:   Now               Cyndi Lowe [[id number]]      Labor Events      Cervical Ripening Date/Time:       Rupture Date/Time:     Rupture Type: SROM       Anesthesia           Start Pushing      Labor onset date/time:   Now     Dilation complete date/time: 3/13/23 19:54:00 EDT Now     Start pushing date/time:    Decision date/time (emergent ):           Delivery ()      Delivery Date/Time:  3/13/23 19:56:00       Details:            Shoulder Dystocia    Add Second Maneuver  Add Third Maneuver  Add Fourth Maneuver  Add Fifth Maneuver  Add Sixth Maneuver  Add Seventh Maneuver  Add Eighth Maneuver  Add Ninth Maneuver       Assisted Delivery Details           Document Additional Attempt         Document Additional Attempt                 Cord           Placenta           Lacerations           Vaginal Counts        Sponges Needles Instruments   Initial Counts      Final Counts      If the count is incorrect due to Intentionally Retained Foreign Object (IRFO) add the IRFO LDA in Lines/Drains.  Add LDA: Link to LDA       Blood Loss  Mother: MynorTiny #[id number]     Start of Mother's Information      Delivery Blood Loss  23 0756 - 23      None                 End of Mother's Information  Mother: Tiny Lowe #[id number]                Delivery Providers    Delivering clinician:              Furman Assessment       Apgar Scoring Key:    0 1 2    Skin  Color: Blue or pale Acrocyanotic Completely pink    Heart Rate: Absent <100 bpm >100 bpm    Reflex Irritability: No response Grimace Cry or active withdrawal    Muscle Tone: Limp Some flexion Active motion    Respiratory Effort: Absent Weak cry; hypoventilation Good, crying                      Skin Color:   Heart Rate:   Reflex Irritability:   Muscle Tone:   Respiratory Effort:    Total:            1 Minute:        Apgar 1 total from OB History    5 Minute:        Apgar 5 total from OB History    10 Minute:              15 Minute:              20 Minute:                                 Resuscitation                Measurements               Title      Skin to Skin Initiation Date/Time:       Skin to Skin End Date/Time:

## 2023-03-14 NOTE — PROGRESS NOTES
Pt presents to unit in labor. Pt feeling urge to push. Thuy Solomon called to bedside. States she went to the bathroom 0800, and noticed LOF. She did not feel fluid leak again until around 1630 that continued to trickle, she was not feeling ctx at that time. Stated ctx began around 1830. Pt had very limited care, [facility]. Then she moved to [place], [place] where she continued her care. Pt moved back to [facility] in January. Pt was incarcerated on January 15th for approximately 5 days. She was seen once on our Antepartum for vomiting related to withdrawals from heroin.

## 2023-03-14 NOTE — CARE COORDINATION
SW Discharge Planning   SW received consult for \" active heroin user, needs referral for treatment\"    KAVON met privately with Frida Bryan ( [phone]) mother to a baby girl not yet named and introduced self and role. Saskia Laura reported that she currently resides at the address listed in the chart and stated that the father of this baby is Vega Dave ( 8/5/92). Saskia Laura stated that due to substance abuse issues she does not currently have custody of her children,  Selene Rehman ( 4//4/06) Krystyna Guerra ( 1/2/12)  Vega Dave ( 9/3020) and Dangelo Layton ( 2/5/22). Oseas Zuleta also has a history of substance abuse however has been doing well in treatment and has been staying sober. Saskia Laura stated that she is currently unemployed and baby will be added to her [employer]. Saskia Laura stated that she began prenatal care in [facility], however then moved to [facility] and was recently in nursing home due to drug possession. SW discussed the importance of baby receiving adequate pediatric care and she expressed understanding. Saskia Laura Reported that she has all needed items including a car seat and pack and play. We discussed safe sleep practices. Saskia Laura is previously known to this , and opened up to SW asking for help to find treatment. Saskia Laura reported that she has been using heroin, fentanyl and cocaine twice daily. Per Saskia Laura she has a history of bipolar and PTSD. Saskia Laura was agreeable in working with SW and peer recovery to find a suitable treatment option.  Saskia Laura also expressed understanding for the need of a [facility] ( [phone]) referral.       PLAN    Baby can NOT be discharged home until [facility] ( [phone]) provides disposition  SW to continue communication with nursing staff and [facility] ( [phone])    SW to assist with substance abuse treatment along side peer recovery     Electronically signed by DUNIA Walter on 3/14/2023 at 10:53 AM

## 2023-03-14 NOTE — PROGRESS NOTES
Patient admitted into room, oriented to surroundings. Fundus firm @ U-2, small amount of bleeding, no clots. Admission Packet/education reviewed with Pt. Infant safety/education completed. Pt declines TDAP and flu vaccine. PPD questionnaire given to pt. Instructed to call for assistance.

## 2023-03-15 VITALS
HEIGHT: 65 IN | WEIGHT: 168 LBS | TEMPERATURE: 98.7 F | RESPIRATION RATE: 18 BRPM | BODY MASS INDEX: 27.99 KG/M2 | HEART RATE: 75 BPM | DIASTOLIC BLOOD PRESSURE: 57 MMHG | OXYGEN SATURATION: 99 % | SYSTOLIC BLOOD PRESSURE: 126 MMHG

## 2023-03-15 LAB
BENZOYLECGONINE, QUANTITATIVE, URINE: >1000
COMMENT: NORMAL
FENTANYL, URN, QUANT: 595.4
NORFENTANYL, URN, QUANT: >1000

## 2023-03-15 PROCEDURE — 6370000000 HC RX 637 (ALT 250 FOR IP): Performed by: ADVANCED PRACTICE MIDWIFE

## 2023-03-15 PROCEDURE — 6360000002 HC RX W HCPCS: Performed by: ADVANCED PRACTICE MIDWIFE

## 2023-03-15 PROCEDURE — 6370000000 HC RX 637 (ALT 250 FOR IP)

## 2023-03-15 PROCEDURE — 6370000000 HC RX 637 (ALT 250 FOR IP): Performed by: MIDWIFE

## 2023-03-15 RX ORDER — BUPRENORPHINE 2 MG/1
4 TABLET SUBLINGUAL 2 TIMES DAILY
Qty: 20 TABLET | Refills: 0 | Status: SHIPPED | OUTPATIENT
Start: 2023-03-15 | End: 2023-03-15 | Stop reason: SDUPTHER

## 2023-03-15 RX ORDER — BUPRENORPHINE 2 MG/1
4 TABLET SUBLINGUAL 2 TIMES DAILY
Qty: 20 TABLET | Refills: 0 | Status: SHIPPED | OUTPATIENT
Start: 2023-03-15 | End: 2023-03-20

## 2023-03-15 RX ORDER — IBUPROFEN 800 MG/1
800 TABLET ORAL EVERY 8 HOURS PRN
Qty: 120 TABLET | Refills: 3 | Status: SHIPPED | OUTPATIENT
Start: 2023-03-15

## 2023-03-15 RX ORDER — CLONIDINE HYDROCHLORIDE 0.1 MG/1
0.1 TABLET ORAL 3 TIMES DAILY PRN
Qty: 9 TABLET | Refills: 0 | Status: SHIPPED | OUTPATIENT
Start: 2023-03-15 | End: 2023-03-15 | Stop reason: HOSPADM

## 2023-03-15 RX ORDER — BUPRENORPHINE 2 MG/1
4 TABLET SUBLINGUAL 2 TIMES DAILY
Status: DISCONTINUED | OUTPATIENT
Start: 2023-03-15 | End: 2023-03-16 | Stop reason: HOSPADM

## 2023-03-15 RX ORDER — BUPRENORPHINE 2 MG/1
4 TABLET SUBLINGUAL 2 TIMES DAILY
Qty: 12 TABLET | Refills: 0 | Status: SHIPPED | OUTPATIENT
Start: 2023-03-15 | End: 2023-03-15 | Stop reason: SDUPTHER

## 2023-03-15 RX ORDER — ONDANSETRON 4 MG/1
4 TABLET, FILM COATED ORAL 3 TIMES DAILY PRN
Qty: 15 TABLET | Refills: 0 | Status: SHIPPED | OUTPATIENT
Start: 2023-03-15

## 2023-03-15 RX ORDER — FERROUS SULFATE 325(65) MG
325 TABLET ORAL 2 TIMES DAILY WITH MEALS
Qty: 30 TABLET | Refills: 3 | Status: SHIPPED | OUTPATIENT
Start: 2023-03-15

## 2023-03-15 RX ORDER — CLONIDINE HYDROCHLORIDE 0.1 MG/1
0.1 TABLET ORAL 3 TIMES DAILY
Status: DISCONTINUED | OUTPATIENT
Start: 2023-03-15 | End: 2023-03-16 | Stop reason: HOSPADM

## 2023-03-15 RX ADMIN — DOCUSATE SODIUM 100 MG: 100 CAPSULE, LIQUID FILLED ORAL at 00:11

## 2023-03-15 RX ADMIN — ONDANSETRON 4 MG: 4 TABLET, ORALLY DISINTEGRATING ORAL at 05:59

## 2023-03-15 RX ADMIN — BUPRENORPHINE HCL 4 MG: 2 TABLET SUBLINGUAL at 13:02

## 2023-03-15 RX ADMIN — CLONIDINE HYDROCHLORIDE 0.1 MG: 0.1 TABLET ORAL at 11:02

## 2023-03-15 RX ADMIN — BUPRENORPHINE HCL 4 MG: 2 TABLET SUBLINGUAL at 21:39

## 2023-03-15 RX ADMIN — KETOROLAC TROMETHAMINE 30 MG: 30 INJECTION, SOLUTION INTRAMUSCULAR; INTRAVENOUS at 05:45

## 2023-03-15 RX ADMIN — CLONIDINE HYDROCHLORIDE 0.1 MG: 0.1 TABLET ORAL at 00:11

## 2023-03-15 RX ADMIN — IBUPROFEN 800 MG: 800 TABLET, FILM COATED ORAL at 08:02

## 2023-03-15 RX ADMIN — DOCUSATE SODIUM 100 MG: 100 CAPSULE, LIQUID FILLED ORAL at 08:02

## 2023-03-15 RX ADMIN — BUPRENORPHINE HCL 4 MG: 2 TABLET SUBLINGUAL at 05:59

## 2023-03-15 RX ADMIN — IBUPROFEN 800 MG: 800 TABLET, FILM COATED ORAL at 18:40

## 2023-03-15 ASSESSMENT — PAIN DESCRIPTION - ONSET
ONSET: GRADUAL
ONSET: ON-GOING

## 2023-03-15 ASSESSMENT — PAIN DESCRIPTION - PAIN TYPE
TYPE: ACUTE PAIN

## 2023-03-15 ASSESSMENT — PAIN DESCRIPTION - ORIENTATION
ORIENTATION: RIGHT
ORIENTATION: MID
ORIENTATION: LOWER
ORIENTATION: MID
ORIENTATION: MID

## 2023-03-15 ASSESSMENT — PAIN SCALES - GENERAL
PAINLEVEL_OUTOF10: 10
PAINLEVEL_OUTOF10: 10
PAINLEVEL_OUTOF10: 9
PAINLEVEL_OUTOF10: 7
PAINLEVEL_OUTOF10: 9
PAINLEVEL_OUTOF10: 5
PAINLEVEL_OUTOF10: 0
PAINLEVEL_OUTOF10: 4
PAINLEVEL_OUTOF10: 3
PAINLEVEL_OUTOF10: 7

## 2023-03-15 ASSESSMENT — PAIN DESCRIPTION - DESCRIPTORS
DESCRIPTORS: ACHING;DISCOMFORT;THROBBING
DESCRIPTORS: ACHING;CRAMPING;DISCOMFORT
DESCRIPTORS: ACHING;DISCOMFORT;TENDER
DESCRIPTORS: CRAMPING;ACHING;DISCOMFORT
DESCRIPTORS: ACHING;DISCOMFORT;CRAMPING
DESCRIPTORS: ACHING;DISCOMFORT;SORE
DESCRIPTORS: ACHING;SORE

## 2023-03-15 ASSESSMENT — PAIN DESCRIPTION - LOCATION
LOCATION: ABDOMEN
LOCATION: BACK;ABDOMEN
LOCATION: ABDOMEN
LOCATION: BACK
LOCATION: ABDOMEN;GENERALIZED

## 2023-03-15 ASSESSMENT — PAIN - FUNCTIONAL ASSESSMENT
PAIN_FUNCTIONAL_ASSESSMENT: ACTIVITIES ARE NOT PREVENTED

## 2023-03-15 ASSESSMENT — PAIN DESCRIPTION - FREQUENCY
FREQUENCY: INTERMITTENT

## 2023-03-15 NOTE — FLOWSHEET NOTE
Patient called this RN at [phone] with concern that her home pharmacy, [facility], did not carry subutex.

## 2023-03-15 NOTE — FLOWSHEET NOTE
This RN called KLEBER Slade and notified her that patient is reporting that she is unable to obtain subutex from [facility] stating that they report that they do not carry subutex. Shreya Clark states that she will contact [address] from peer support and ROHITH Rios to notify them of the above and to attempt to assist patient in obtaining subutex from an outpatient pharmacy.

## 2023-03-15 NOTE — PLAN OF CARE
Problem: Postpartum  Goal: Experiences normal postpartum course  Description:  Postpartum OB-Pregnancy care plan goal which identifies if the mother is experiencing a normal postpartum course  3/15/2023 1013 by Shanice Currie RN  Outcome: Progressing  3/15/2023 0106 by Nilda Encarnacion RN  Outcome: Progressing  Goal: Appropriate maternal -  bonding  Description:  Postpartum OB-Pregnancy care plan goal which identifies if the mother and  are bonding appropriately  3/15/2023 1013 by Shanice Currie RN  Outcome: Progressing  3/15/2023 010 by Nilda Encarnacion RN  Outcome: Progressing  Goal: Establishment of infant feeding pattern  Description:  Postpartum OB-Pregnancy care plan goal which identifies if the mother is establishing a feeding pattern with their   3/15/2023 1013 by Shanice Currie RN  Outcome: Progressing  3/15/2023 0106 by Nilda Encarnacion RN  Outcome: Progressing  Goal: Incisions, wounds, or drain sites healing without S/S of infection  Outcome: Progressing     Problem: Infection - Adult  Goal: Absence of infection at discharge  3/15/2023 1013 by Shanice Currie RN  Outcome: Progressing  3/15/2023 0106 by Nilda Encarnacion RN  Outcome: Progressing  Goal: Absence of infection during hospitalization  Outcome: Progressing  Goal: Absence of fever/infection during anticipated neutropenic period  Outcome: Progressing     Problem: Safety - Adult  Goal: Free from fall injury  Recent Flowsheet Documentation  Taken 3/15/2023 0802 by Shanice Currie RN  Free From Fall Injury:   Instruct family/caregiver on patient safety   Based on caregiver fall risk screen, instruct family/caregiver to ask for assistance with transferring infant if caregiver noted to have fall risk factors  3/15/2023 0106 by Nilda Encarnacion RN  Outcome: Progressing

## 2023-03-15 NOTE — DISCHARGE SUMMARY
Department of Obstetrics and Gynecology  Nurse Practitioner Obstetrics Discharge Summary    Primary OB Clinician: [facility]    The patient is a 30 y.o. female,  A0, Estimated Date of Delivery: 3/25/23, EGA: 45 and 2/7 weeks. A, Rh+  Her current obstetrical history is significant for polysubstance abuse, limited prenatal care  Reason for admission: Onset of Labor    PAST OB HISTORY  OB History    Para Term  AB Living   5 5 2 1   5   SAB IAB Ectopic Molar Multiple Live Births           0 4      # Outcome Date GA Lbr Altaf/2nd Weight Sex Delivery Anes PTL Lv   5 Term 23 38w2d / 00:02 7 lb 0.9 oz (3.2 kg) F Vag-Spont None N RAUDEL      Complications: Precipitous Labor   4  22 34w0d  5 lb 15.6 oz (2.71 kg) F Vag-Spont None  RAUDEL      Complications: Precipitous Labor, No prenatal care in current pregnancy   3 Term 20 39w0d / 01:30 7 lb 9.7 oz (3.45 kg) M Vag-Spont None N RAUDEL   2 Para 12    F Vag-Spont None     1 Para 06    F Vag-Spont   RAUDEL       Past Medical History:    Past Medical History:   Diagnosis Date    Asthma     childhood sports induced    Bipolar affective disorder, current episode depressed ([facility] [address]     Drug abuse ([facility] [address]     Hepatitis C     Mitral valve prolapse     PTSD (post-traumatic stress disorder)       Past Surgical History:    Past Surgical History:   Procedure Laterality Date    ELBOW SURGERY Left     childhood      Medications Prior to Admission:   Medications Prior to Admission: clonazePAM (KLONOPIN) 0.5 MG tablet, Take 0.5 mg by mouth 2 times daily as needed.   [DISCONTINUED] ibuprofen (ADVIL;MOTRIN) 800 MG tablet, Take 1 tablet by mouth every 8 hours as needed for Pain (Patient not taking: Reported on 2023)  [DISCONTINUED] Prenatal Vit-Fe Fumarate-FA (PRENATAL PLUS/IRON) 27-1 MG TABS tablet, Take 1 tablet by mouth daily (Patient not taking: No sig reported)  [DISCONTINUED] ondansetron (ZOFRAN ODT) 4 MG disintegrating tablet, Take 1 tablet by mouth every 8 hours as needed for Nausea or Vomiting (Patient not taking: Reported on 2023)  Allergies:  Naloxone and Tylenol [acetaminophen]    Intrapartum complications: None  Date of Delivery: 3/13/23 ; Time of Delivery:    Delivery Type: spontaneous vaginal delivery  Anesthesia: None  Baby: Liveborn female, Apgars 8/9  Information for the patient's :  Kandace Rios [[id number]]   female   Birth Weight: 7 lb 0.9 oz (3.2 kg)   Placenta: spontaneous  Laceration: none  Episiotomy: none,    Postpartum complications: psych consult for withdrawl    Postpartum Procedures  None    Inpatient Consult: psych, social work, peer recovery    Feeding method: bottle - Similac with iron  Rh Immune globulin given: no  Rubella vaccine given: no    Discharge Date: 3/15/23  Early Discharge:  NO    Plan  Discharge to: Home    [facility]     Follow-up appointment with [facility] in 6 weeks. Time Spent on Discharge:  40 minutes were spent in patient examination, evaluation, counseling as well as medication reconciliation, prescriptions for required medications, discharge plan and follow up.           Obstetric Discharge Summary    Admitting Diagnosis  IUP 38.2 weeks  OB History          5    Para   5    Term   2       1    AB        Living   5         SAB        IAB        Ectopic        Molar        Multiple   0    Live Births   4                Reasons for Admission on 3/13/2023  7:43 PM  Normal pregnancy, third trimester [Z34.93]  Pregnancy with 38 completed weeks gestation [Z3A.38]  No comment available  Onset of Labor    Prenatal Procedures  Management of Medical Complications: polysubstance abuse  Management of Obstetric Complications: limited prenatal care, substance abuse    Intrapartum Procedures                 Spontaneous Vaginal Delivery: See Labor and Delivery Summary       Postpartum Procedures  None    Postpartum/Operative Complications        Data  Information for the patient's :  Naye Dominguez Girl Keny Blood [[id number]]   female   Birth Weight: 7 lb 0.9 oz (3.2 kg)   Discharge no. CPS on case    Discharge Diagnosis       Discharge Information  Current Discharge Medication List        START taking these medications    Details   ferrous sulfate (IRON 325) 325 (65 Fe) MG tablet Take 1 tablet by mouth 2 times daily (with meals)  Qty: 30 tablet, Refills: 3           CONTINUE these medications which have CHANGED    Details   ibuprofen (ADVIL;MOTRIN) 800 MG tablet Take 1 tablet by mouth every 8 hours as needed for Pain  Qty: 120 tablet, Refills: 3           CONTINUE these medications which have NOT CHANGED    Details   clonazePAM (KLONOPIN) 0.5 MG tablet Take 0.5 mg by mouth 2 times daily as needed. STOP taking these medications       Prenatal Vit-Fe Fumarate-FA (PRENATAL PLUS/IRON) 27-1 MG TABS tablet Comments:   Reason for Stopping:         ondansetron (ZOFRAN ODT) 4 MG disintegrating tablet Comments:   Reason for Stopping:               No discharge procedures on file.

## 2023-03-15 NOTE — CARE COORDINATION
SW Discharge Planning     SW was notified by nursing staff that they were having difficulty finding a place that would take patient's subxone script outpatient. Per Nurse, the closest pharmacy would be 40 minuets away for mother. KAVON collaborated with Clary Hatfield, and [employer], Jimenez Perez ( [phone]). Per Jimenez Perez, if patient is able to receive her last dosage of suboxone this evening, then mother can come in to their [address] [facility] address to complete an assessment. Per Annabel Salcido will prescribe mother her suboxone out patient until mother and baby are admitted to their inpatient unit on Saturday. SW did provide patient a taxi voucher to go to [facility] first thing tomorrow morning, and [facility] will provide her with transportation back to the hospital. Patient was agreeable with plan. KAVON spoke with [facility] ( [phone]) , Aubree Zhou, who reported that as long as mother goes inpatient to [facility] on Saturday that baby CAN be discharged to mother, and both can remain inpatient at [facility] together. PLAN    Mother will follow up with [facility] tomorrow and return the the hospital to bond with baby   On Saturday, [facility] will present to the hospital to  both mother and baby, Nursing staff is to walk out with mother and baby to ensure that they leave with [facility]. [facility] ) expressed understanding that if mother and baby choose not to stay at [facility] that children services would need to be notified immediatly.     Electronically signed by DUNIA Garnett on 3/15/2023 at 3:41 PM

## 2023-03-15 NOTE — PROGRESS NOTES
CLINICAL PHARMACY NOTE: MEDS TO BEDS    Total # of Prescriptions Filled: 1   The following medications were delivered to the patient:   Ondansetron 4 mg     Additional Documentation:  Delivered to patient -- Kajal Kirk

## 2023-03-15 NOTE — PLAN OF CARE
Problem: Postpartum  Goal: Experiences normal postpartum course  Description:  Postpartum OB-Pregnancy care plan goal which identifies if the mother is experiencing a normal postpartum course  Outcome: Progressing     Problem: Postpartum  Goal: Appropriate maternal -  bonding  Description:  Postpartum OB-Pregnancy care plan goal which identifies if the mother and  are bonding appropriately  Outcome: Progressing     Problem: Postpartum  Goal: Establishment of infant feeding pattern  Description:  Postpartum OB-Pregnancy care plan goal which identifies if the mother is establishing a feeding pattern with their   Outcome: Progressing     Problem: Infection - Adult  Goal: Absence of infection at discharge  Outcome: Progressing     Problem: Safety - Adult  Goal: Free from fall injury  Outcome: Progressing

## 2023-03-15 NOTE — PROGRESS NOTES
PSYCHIATRY ATTENDING NOTE    CC: \"I am really going through it. \"    S: Patient seen at the bedside for a follow up. Initial consultation completed 3/14/2023. Patient sitting up in bed holding baby. Patient became tearful during assessment due to unknown disposition. Patient reports mild withdrawal symptoms of abdominal cramping, restless legs, anxiety, and body aches. She denies vomiting or diarrhea. She received 1 dose of buprenorphine 4 mg at 5 AM.  She reports this was effective and she fell back to sleep after this. Patient denies current cravings, states she just feels [place]. \"    VITALS:  BP (!) 110/58   Pulse 77   Temp 98.1 °F (36.7 °C) (Oral)   Resp 18   Ht 5' 5\" (1.651 m)   Wt 168 lb (76.2 kg)   LMP  (LMP Unknown)   SpO2 94%   Breastfeeding Unknown   BMI 27.96 kg/m²     MSE: 29-year-old  female, appears older than age. She is pleasant and cooperative, superficially forthcoming with information. Normal psychomotor activity. Mood \"I am really going through it. \"  Affect flexible, tearful during assessment due to unknown disposition. Speech clear, normal rate and rhythm. Thought process organized. Thought content future oriented. Patient denies suicidal or homicidal ideation, intent, or plan. Denies hallucinations, delusions, paranoia. Orientation, concentration, recent, remote memory are grossly intact. Fund of knowledge fair. Insight fair. Judgment poor.     LABS:   Admission on 03/13/2023   Component Date Value Ref Range Status    WBC 03/13/2023 13.6 (A)  4.5 - 11.5 E9/L Final    RBC 03/13/2023 3.64  3.50 - 5.50 E12/L Final    Hemoglobin 03/13/2023 10.6 (A)  11.5 - 15.5 g/dL Final    Hematocrit 03/13/2023 31.8 (A)  34.0 - 48.0 % Final    MCV 03/13/2023 87.4  80.0 - 99.9 fL Final    MCH 03/13/2023 29.1  26.0 - 35.0 pg Final    MCHC 03/13/2023 33.3  32.0 - 34.5 % Final    RDW 03/13/2023 13.8  11.5 - 15.0 fL Final    Platelets 10/83/8754 205  130 - 450 E9/L Final    MPV 03/13/2023 11.9  7.0 - 12.0 fL Final    Neutrophils % 03/13/2023 89.7 (A)  43.0 - 80.0 % Final    Immature Granulocytes % 03/13/2023 0.6  0.0 - 5.0 % Final    Lymphocytes % 03/13/2023 5.8 (A)  20.0 - 42.0 % Final    Monocytes % 03/13/2023 3.7  2.0 - 12.0 % Final    Eosinophils % 03/13/2023 0.1  0.0 - 6.0 % Final    Basophils % 03/13/2023 0.1  0.0 - 2.0 % Final    Neutrophils Absolute 03/13/2023 12.23 (A)  1.80 - 7.30 E9/L Final    Immature Granulocytes # 03/13/2023 0.08  E9/L Final    Lymphocytes Absolute 03/13/2023 0.79 (A)  1.50 - 4.00 E9/L Final    Monocytes Absolute 03/13/2023 0.51  0.10 - 0.95 E9/L Final    Eosinophils Absolute 03/13/2023 0.02 (A)  0.05 - 0.50 E9/L Final    Basophils Absolute 03/13/2023 0.01  0.00 - 0.20 E9/L Final    ABO/Rh 03/13/2023 A POS   Final    Antibody Screen 03/13/2023 NEG   Final    Amphetamine Screen, Urine 03/13/2023 NOT DETECTED  Negative <1000 ng/mL Final    Barbiturate Screen, Ur 03/13/2023 NOT DETECTED  Negative < 200 ng/mL Final    Benzodiazepine Screen, Urine 03/13/2023 NOT DETECTED  Negative < 200 ng/mL Final    Cannabinoid Scrn, Ur 03/13/2023 NOT DETECTED  Negative < 50ng/mL Final    Cocaine Metabolite Screen, Urine 03/13/2023 POSITIVE (A)  Negative < 300 ng/mL Final    Opiate Scrn, Ur 03/13/2023 NOT DETECTED  Negative < 300ng/mL Final    Note:  The Opiate Screen is not intended to detect Oxycodone. PCP Screen, Urine 03/13/2023 NOT DETECTED  Negative < 25 ng/mL Final    Methadone Screen, Urine 03/13/2023 NOT DETECTED  Negative <300 ng/mL Final    Oxycodone Urine 03/13/2023 NOT DETECTED  Negative <100 ng/mL Final    FENTANYL SCREEN, URINE 03/13/2023 POSITIVE (A)  Negative <1 ng/mL Final    Comment: Note: Therapeutic levels of some antipsychotics  (e.g., Haloperidol, Risperidone), antidepressants  (e.g., Trazadone), Methamphetamine and the hypertension  drug Labetalol are known to show cross-reactivity and  cause false positive results.  Therefore, confirmatory  testing for Fentanyl and metabolites should be  considered if clinically indicated. Drug Screen Comment: 03/13/2023 see below   Final    Comment: These drug screen results are for medical purposes only and  should not be considered definitive or confirmed. The drug  methodology concentration value must be greater than or equal  to the cutoff to be reported as positive. Confirmatory testing  orders and/or interpretive screening questions can be directed  to toxicology at [phone]. The absence of expected drug(s) and/or metabolite(s) may be due  to inappropriate timing of specimen collection relative to drug  administration, poor drug absorption, diluted/adulterated urine,  or limitations of screening testing methodology. Rapid HIV 1&2 03/13/2023 see below  Non-reactive Final    Non-reactive for HIV-1 and HIV-2 antibodies    Hemoglobin 03/14/2023 10.0 (A)  11.5 - 15.5 g/dL Final    Hematocrit 03/14/2023 31.1 (A)  34.0 - 48.0 % Final    BENZOYLECGONINE, QUANTITATIVE, URI* 03/13/2023 >1,000.0  Cutoff 50 ng/mL Final    Comment 03/13/2023 see below   Final    Comment: TEST INFORMATION:  Quantitative Drug Detection, Urine    METHODOLOGY:  Liquid Chromatography/Mass Spectrometry    INTERPRETIVE INFORMATION:  The absence of expected drug(s) and /or metabolite(s) may indicate  non-compliance, inappropriate timing of specimen collection relative to drug  administration, poor drug absorption, diluted/adulterated urine, or  limitations of testing. Identification of specific drug(s) may be  problematic due to common metabolites, some of which are prescription drugs  themselves. The concentration value must be greater than or equal to the  cutoff to be reported as a quantitative result. Interpretive results should  be directed to the Toxicology department at [phone].     LABORATORY DEVELOPED TEST (LDT) DISCLAIMER:  This test was developed, and its performance characteristics have been  determined by [facility]. [id number] [facility]. The  laboratory is regulated under CLIA as qualified to perform high-complexity  testing. One or m                           ore of these tests have not been cleared or approved by the  FDA. These results are not intended to be used as the only means for  clinical diagnosis or patient management decisions.       Fentanyl, Urine, Quant 03/13/2023 595.4  Cutoff 5 ng/mL Final    Norfentanyl, Urine, Quant 03/13/2023 >1,000.0  Cutoff 10 ng/mL Final         MEDICATIONS: Current Facility-Administered Medications: cloNIDine (CATAPRES) tablet 0.1 mg, 0.1 mg, Oral, TID  buprenorphine (SUBUTEX) SL tablet 4 mg, 4 mg, SubLINGual, BID  simethicone (MYLICON) chewable tablet 80 mg, 80 mg, Oral, Q6H PRN  ibuprofen (ADVIL;MOTRIN) tablet 800 mg, 800 mg, Oral, Q8H PRN  ketorolac (TORADOL) injection 30 mg, 30 mg, IntraMUSCular, Q6H PRN  ondansetron (ZOFRAN-ODT) disintegrating tablet 4 mg, 4 mg, Oral, Q8H PRN  lactated ringers bolus, 500 mL, IntraVENous, PRN **OR** lactated ringers bolus, 1,000 mL, IntraVENous, PRN  methylergonovine (METHERGINE) injection 200 mcg, 200 mcg, IntraMUSCular, PRN  carboprost (HEMABATE) injection 250 mcg, 250 mcg, IntraMUSCular, PRN  miSOPROStol (CYTOTEC) tablet 800 mcg, 800 mcg, Rectal, PRN  oxytocin (PITOCIN) 15 units in 250 mL infusion, 87.3 vivian-units/min, IntraVENous, Continuous PRN **AND** oxytocin (PITOCIN) 10 unit bolus from the bag, 10 Units, IntraVENous, PRN  lactated ringers IV soln infusion, , IntraVENous, Continuous  sodium chloride flush 0.9 % injection 5-40 mL, 5-40 mL, IntraVENous, 2 times per day  sodium chloride flush 0.9 % injection 5-40 mL, 5-40 mL, IntraVENous, PRN  0.9 % sodium chloride infusion, , IntraVENous, PRN  docusate sodium (COLACE) capsule 100 mg, 100 mg, Oral, BID  lansinoh lanolin ointment, , Topical, PRN  witch hazel-glycerin (TUCKS) pad, , Topical, PRN  benzocaine-menthol (DERMOPLAST) 20-0.5 % spray, , Topical, PRN  ferrous sulfate (IRON 325) tablet 325 mg, 325 mg, Oral, BID WC  measles, mumps & rubella vaccine (MMR) injection 0.5 mL, 0.5 mL, SubCUTAneous, Prior to discharge  tetanus-diphth-acell pertussis (BOOSTRIX) injection 0.5 mL, 0.5 mL, IntraMUSCular, Prior to discharge    ASSESSMENT:  Opioid use disorder    PLAN: Support and reassurance provided to patient. Patient remains interested in treatment for her opioid use disorder. Her first choice is inpatient at [facility] if she is able to take the baby with her. Spoke with peer support Allyssa Brink and Leyla with social work. Leyla relayed to me that she spoke with children services who stated they would consider patient discharging to [facility] with baby. Once patient leaves [facility], she plans to engage in [facility] program after. [facility] has accepted patient for Saturday. Patient will be discharged today but will be able to remain in her room as courtesy on a day by day basis. Patient made aware that this plan can change depending on Children Services final say. She is also aware that if her bed is needed, she will not be able to stay. [facility] requested that she have a script for buprenorphine as they will not be able to administer until Monday. 5 day script sent to [address] [place] [address] as our outpatient pharmacy did not have this medication available, patient aware. Patient given my business card as well as Dr. Sania Saunders. There is no indication for inpatient psychiatric admission. Okay to discharge from a psychiatric standpoint once medically stable. Nursing, , and peer support updated. I can be reached through [address] [place] [address] with any needs. Thank you. 1430: Received call from  that [address] [place] [address] in [place] [address] does not carry Subutex. Nursing staff has called multiple pharmacies in the area without success.  Plan for patient to get her dose of Subutex here in the hospital this evening and then go outpatient to [facility] tomorrow morning for evaluation and treatment until she is able to go inpatient with baby.     NOTE:  This report was transcribed using voice recognition software.  Every effort was made to ensure accuracy; however, inadvertent computerized transcription errors may be present.                          Electronically signed by JACK Fernandez CNP on 3/15/2023 at 12:52 PM

## 2023-03-15 NOTE — DISCHARGE INSTRUCTIONS
Follow-up with your OB doctor in 6 weeks for vaginal delivery unless otherwise instructed. Call office for an appointment. For breastfeeding support, you can contact our lactation specialists at [phone] or [phone]    DIET  Eat a well balanced diet focusing on foods high in fiber and protein  Drink plenty of fluids especially water. To avoid constipation you may take a mild stool softener as recommended by your doctor or midwife. ACTIVITY  Gradually increase your activity. Resume exercise regimen only after advised by your doctor or midwife. Avoid lifting anything heavier than your baby or a gallon of milk for SIX weeks. Avoid driving until your doctor or midwife has given their approval.  [place] slowly from a lying to sitting and then a standing position. Climb stairs one at a time. Use caution when carrying your baby up and down the stairs. No sexual activity for 6 weeks or until advised by your doctor - Nothing in vagina: intercourse, tampons, or douching. Be prepared to discuss family planning at your follow-up OB visit. You may feel tired or have a lack of energy. You may continue your prenatal vitamin to replenish nutrients post delivery. Nap when baby naps to catch up on sleep. May return to work or school in 6 weeks or as directed by OB. EMOTIONS  You may feed robles, sad, teary, & overwhelmed. Contact your OB provider if you feel you may be showing signs of postpartum depression, or have thoughts of harming yourself or your infant. If infant will not stop crying, contact another adult for help or place infant in their crib on their back and take a break. NEVER shake your infant. BLEEDING  Vaginal bleeding will decrease in amount over the next few weeks. You will notice that as your activity increases, your flow may increase. This is your body's way of telling you, you need to take things easier and rest more often.   Call your OB/ER if you are saturating more than one maxi pad in an hour. BREAST CARE  Take medications as recommended by your doctor or midwife for pain  If you develop a warm, red, tender area on your breast or develop a fever contact your OB provider. For breastfeeding moms:  If you become engorged, feeding may be more difficult or painful for 1-2 days. You may find it helpful to hand express some milk so that the infant can latch on more easily. While breastfeeding, continue to take your prenatal vitamins as directed by your doctor or midwife. Only take medications verified as safe for breastfeeding. For non-breastfeeding moms:  You may apply ice packs to your breasts over your bra for twenty minutes at a time for comfort. Avoid stimulation to your breasts, when showering allow the water to strike your back not your breasts. Wear a good fitting bra until your milk dries, such as a sports bra. INCISIONAL CARE / RAH CARE  Clean your incision in the shower with mild soap. After shower pat the incision area dry and leave open to air. If used, Steri-stipes should be removed by 2 weeks. If used, [place] should be removed by the OB in office by 1 week. If used/ordered, an abdominal binder may provide support for your incision. Use the rah-bottle after toileting until bleeding stops. Cleanse your perineum from front to back  If used, stitches or internal clips will dissolve in 4-6 weeks. You may use a sitz bath or soak in a clean tub as needed for comfort. Kegel exercises will help restore bladder control. SWELLING  Keep your legs elevated when sitting or lying. When wearing stocking or socks, make sure they are not too tight. WHEN TO CALL THE DOCTOR  If you have a temp of 100.4 or more. If your bleeding has increased and you are saturating a pad in an hour. Your abdomen is tender to touch. You are passing blood clots bigger than the size of a lemon. If you are experiencing extreme weakness or dizziness.    If you are having flu-like symptoms such as achy muscles or joints. There is a foul smell or a green color to your vaginal bleeding. If you have pain that cannot be relieved. You have persistent burning with urination or frequency. Call if you have concerns about your well-being. You are unable to sleep, eat, or are having thoughts of harming yourself or your baby. You have swelling, bleeding, drainage, foul odor, redness, or warmth in/around your incision or stitches. You have a red, warm, tender area in you calf.

## 2023-03-15 NOTE — PROGRESS NOTES
PPD #2     Patient:  Jessica Villar     Admit Date:  3/13/2023  7:43 PM  Medical Record Number:  [id number]   Today's Date: 3/15/2023    S: No complaints; tolerating diet: affirms ; ambulating well: affirms; voiding without difficulty:  affirms; bm: affirms; flatus: affirms; pain meds appropriate: affirms; vaginal bleeding: thin lochia. Pt has a a lot of social issues. She will not be discharged with her baby, and hoping to stay as courtesy. Pt does not have custody of other children. Hx of polysubstance abuse twice daily. She is agreeable to recovery program upon discharge. [facility], Peer  is working with her.       O:   Vitals:    03/14/23 1956 03/14/23 2308 03/15/23 0011 03/15/23 0717   BP: (!) 102/59 114/61 (!) 116/57 (!) 106/55   Pulse: 92 70 92 76   Resp: 16 16  18   Temp: 98.5 °F (36.9 °C) 98.1 °F (36.7 °C)  98.1 °F (36.7 °C)   TempSrc: Oral Oral  Oral   SpO2: 98% 98%  94%   Weight:       Height:         Gen: A&O, cooperative, pleasant   Heart: RRR   Lungs: CTA b/l   Abd; soft, NT, non distended, +BS  Back: no CVA or paraspinal tenderness   Ext: No clubbing, cyanosis, edema:trace , no cords palpable, no calf tenderness   Neuro: intact   Uterus: firm, well contracted, nt   Perineum: intact, healing well   Lulú pad: thin lochia    Lab Results   Component Value Date    HGB 10.0 (L) 03/14/2023    HCT 31.1 (L) 03/14/2023      Recent Results (from the past 72 hour(s))   DRUG SCREEN MULTI URINE    Collection Time: 03/13/23  8:00 PM   Result Value Ref Range    Amphetamine Screen, Urine NOT DETECTED Negative <1000 ng/mL    Barbiturate Screen, Ur NOT DETECTED Negative < 200 ng/mL    Benzodiazepine Screen, Urine NOT DETECTED Negative < 200 ng/mL    Cannabinoid Scrn, Ur NOT DETECTED Negative < 50ng/mL    Cocaine Metabolite Screen, Urine POSITIVE (A) Negative < 300 ng/mL    Opiate Scrn, Ur NOT DETECTED Negative < 300ng/mL    PCP Screen, Urine NOT DETECTED Negative < 25 ng/mL    Methadone Screen, Urine NOT DETECTED Negative <300 ng/mL    Oxycodone Urine NOT DETECTED Negative <100 ng/mL    FENTANYL SCREEN, URINE POSITIVE (A) Negative <1 ng/mL    Drug Screen Comment: see below    BENZOYLECGONINE, Quantitative, Urine    Collection Time: 23  8:00 PM   Result Value Ref Range    Comment see below    CBC auto differential    Collection Time: 23  8:30 PM   Result Value Ref Range    WBC 13.6 (H) 4.5 - 11.5 E9/L    RBC 3.64 3.50 - 5.50 E12/L    Hemoglobin 10.6 (L) 11.5 - 15.5 g/dL    Hematocrit 31.8 (L) 34.0 - 48.0 %    MCV 87.4 80.0 - 99.9 fL    MCH 29.1 26.0 - 35.0 pg    MCHC 33.3 32.0 - 34.5 %    RDW 13.8 11.5 - 15.0 fL    Platelets 205 130 - 450 E9/L    MPV 11.9 7.0 - 12.0 fL    Neutrophils % 89.7 (H) 43.0 - 80.0 %    Immature Granulocytes % 0.6 0.0 - 5.0 %    Lymphocytes % 5.8 (L) 20.0 - 42.0 %    Monocytes % 3.7 2.0 - 12.0 %    Eosinophils % 0.1 0.0 - 6.0 %    Basophils % 0.1 0.0 - 2.0 %    Neutrophils Absolute 12.23 (H) 1.80 - 7.30 E9/L    Immature Granulocytes # 0.08 E9/L    Lymphocytes Absolute 0.79 (L) 1.50 - 4.00 E9/L    Monocytes Absolute 0.51 0.10 - 0.95 E9/L    Eosinophils Absolute 0.02 (L) 0.05 - 0.50 E9/L    Basophils Absolute 0.01 0.00 - 0.20 E9/L   TYPE AND SCREEN    Collection Time: 23  8:30 PM   Result Value Ref Range    ABO/Rh A POS     Antibody Screen NEG    HIV Rapid 1&2    Collection Time: 23  8:30 PM   Result Value Ref Range    Rapid HIV 1&2 see below Non-reactive   Hemoglobin and hematocrit, blood    Collection Time: 23  5:36 AM   Result Value Ref Range    Hemoglobin 10.0 (L) 11.5 - 15.5 g/dL    Hematocrit 31.1 (L) 34.0 - 48.0 %       A: 37 y.o. female  at 38w2d weeks  PPD #2 S/P ; Episiotomy was not needed  Patient Active Problem List   Diagnosis    No prenatal care in current pregnancy in third trimester    Withdrawal from opioids (HCC)    Positive pregnancy test    Positive urine drug screen    Normal pregnancy, third trimester    Pregnancy with 38 completed  weeks gestation    Normal delivery    Hepatitis C antibody test positive    Opioid use disorder       P: Routine PP care. Psych to round today, discharge pending their recommendation  Ok to Discharge per ob standpoint, recovery program pending with instructions, precautions. Prescription for Ibuprofen 800 mg. Continue PNV with Iron daily. Follow-up office 6 weeks for postpartum visit.     JACK Browning - RENNY    3/15/2023 10:09 AM

## 2023-03-15 NOTE — FLOWSHEET NOTE
JACK Fernandez, ordered that patient's COWS assessments may be discontinued and that patient may receive subutex today at 1245 and again at 2100 despite having received subutex 4 mg SL at 0559 today. JACK Fernandez states that patient may be discharged from hospital from psychiatry perspective today.

## 2023-03-15 NOTE — PROGRESS NOTES
CLINICAL PHARMACY NOTE: MEDS TO BEDS    Total # of Prescriptions Filled: 2   The following medications were delivered to the patient:  Ferosul 325 mg  Ibuprofen 800 mg    Additional Documentation:  Delivered by diego lindsey sent to Erika Horton NP to sent script to her pharmacy to
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
